# Patient Record
Sex: FEMALE | Race: BLACK OR AFRICAN AMERICAN | NOT HISPANIC OR LATINO | Employment: UNEMPLOYED | URBAN - METROPOLITAN AREA
[De-identification: names, ages, dates, MRNs, and addresses within clinical notes are randomized per-mention and may not be internally consistent; named-entity substitution may affect disease eponyms.]

---

## 2021-03-27 ENCOUNTER — HISTORICAL (OUTPATIENT)
Dept: ADMINISTRATIVE | Facility: HOSPITAL | Age: 21
End: 2021-03-27

## 2021-03-27 LAB — SARS-COV-2 RNA RESP QL NAA+PROBE: DETECTED

## 2021-11-13 ENCOUNTER — HISTORICAL (OUTPATIENT)
Dept: ADMINISTRATIVE | Facility: HOSPITAL | Age: 21
End: 2021-11-13

## 2021-11-13 LAB — POC BETA-HCG (QUAL): NEGATIVE

## 2021-11-15 LAB — FINAL CULTURE: NORMAL

## 2022-04-10 ENCOUNTER — HISTORICAL (OUTPATIENT)
Dept: ADMINISTRATIVE | Facility: HOSPITAL | Age: 22
End: 2022-04-10

## 2022-04-11 ENCOUNTER — HISTORICAL (OUTPATIENT)
Dept: ADMINISTRATIVE | Facility: HOSPITAL | Age: 22
End: 2022-04-11

## 2022-04-28 VITALS
DIASTOLIC BLOOD PRESSURE: 87 MMHG | OXYGEN SATURATION: 99 % | BODY MASS INDEX: 23.49 KG/M2 | SYSTOLIC BLOOD PRESSURE: 126 MMHG | WEIGHT: 137.56 LBS | HEIGHT: 64 IN

## 2022-04-28 VITALS
WEIGHT: 137.56 LBS | DIASTOLIC BLOOD PRESSURE: 87 MMHG | OXYGEN SATURATION: 99 % | SYSTOLIC BLOOD PRESSURE: 126 MMHG | HEIGHT: 64 IN | BODY MASS INDEX: 23.49 KG/M2

## 2023-01-29 ENCOUNTER — OFFICE VISIT (OUTPATIENT)
Dept: URGENT CARE | Facility: CLINIC | Age: 23
End: 2023-01-29
Payer: MEDICAID

## 2023-01-29 VITALS
DIASTOLIC BLOOD PRESSURE: 58 MMHG | RESPIRATION RATE: 16 BRPM | OXYGEN SATURATION: 100 % | HEART RATE: 69 BPM | SYSTOLIC BLOOD PRESSURE: 101 MMHG | BODY MASS INDEX: 18.81 KG/M2 | WEIGHT: 110.19 LBS | TEMPERATURE: 98 F | HEIGHT: 64 IN

## 2023-01-29 DIAGNOSIS — R30.0 DYSURIA: ICD-10-CM

## 2023-01-29 DIAGNOSIS — R31.9 HEMATURIA, UNSPECIFIED TYPE: ICD-10-CM

## 2023-01-29 DIAGNOSIS — N30.01 ACUTE CYSTITIS WITH HEMATURIA: Primary | ICD-10-CM

## 2023-01-29 LAB
BILIRUB UR QL STRIP: NEGATIVE
GLUCOSE UR QL STRIP: NEGATIVE
KETONES UR QL STRIP: NEGATIVE
LEUKOCYTE ESTERASE UR QL STRIP: POSITIVE
PH, POC UA: 7
POC BLOOD, URINE: POSITIVE
POC NITRATES, URINE: NEGATIVE
PROT UR QL STRIP: POSITIVE
SP GR UR STRIP: 1.02 (ref 1–1.03)
UROBILINOGEN UR STRIP-ACNC: 0.2 (ref 0.1–1.1)

## 2023-01-29 PROCEDURE — 87077 CULTURE AEROBIC IDENTIFY: CPT

## 2023-01-29 PROCEDURE — 99214 OFFICE O/P EST MOD 30 MIN: CPT | Mod: PBBFAC

## 2023-01-29 PROCEDURE — 87088 URINE BACTERIA CULTURE: CPT

## 2023-01-29 PROCEDURE — 81003 URINALYSIS AUTO W/O SCOPE: CPT | Mod: PBBFAC

## 2023-01-29 PROCEDURE — 99213 OFFICE O/P EST LOW 20 MIN: CPT | Mod: S$PBB,,,

## 2023-01-29 PROCEDURE — 99213 PR OFFICE/OUTPT VISIT, EST, LEVL III, 20-29 MIN: ICD-10-PCS | Mod: S$PBB,,,

## 2023-01-29 RX ORDER — NITROFURANTOIN 25; 75 MG/1; MG/1
100 CAPSULE ORAL 2 TIMES DAILY
Qty: 14 CAPSULE | Refills: 0 | Status: SHIPPED | OUTPATIENT
Start: 2023-01-29 | End: 2023-02-05

## 2023-01-29 NOTE — PROGRESS NOTES
"Subjective:       Patient ID: Shakir Llanos is a 22 y.o. female.    Vitals:  height is 5' 3.78" (1.62 m) and weight is 50 kg (110 lb 3.2 oz). Her temperature is 98.2 °F (36.8 °C). Her blood pressure is 101/58 (abnormal) and her pulse is 69. Her respiration is 16 and oxygen saturation is 100%.     Chief Complaint: Dysuria (X 1 week with frequency and hematuria. Denies any concern for STI.)    Pt states dysuria and hematuria for a week. Denies vaginal discharge or flank pain.     Dysuria   Associated symptoms include hematuria.     Constitution: Negative.   HENT: Negative.     Neck: neck negative.   Cardiovascular: Negative.    Eyes: Negative.    Respiratory: Negative.     Gastrointestinal: Negative.    Genitourinary:  Positive for dysuria and hematuria.   Musculoskeletal: Negative.    Skin: Negative.    Neurological: Negative.      Objective:      Physical Exam   Constitutional: She is oriented to person, place, and time. normal  HENT:   Head: Normocephalic.   Nose: Nose normal.   Mouth/Throat: Mucous membranes are moist. Oropharynx is clear.   Eyes: Pupils are equal, round, and reactive to light.   Cardiovascular: Normal rate and normal pulses.   Pulmonary/Chest: Effort normal.   Abdominal: Normal appearance. Soft. There is left CVA tenderness. There is no right CVA tenderness.   Musculoskeletal: Normal range of motion.         General: Normal range of motion.   Neurological: She is alert and oriented to person, place, and time.   Skin: Skin is warm.   Vitals reviewed.      Results for orders placed or performed in visit on 01/29/23   POCT Urinalysis, Dipstick, Automated, W/O Scope   Result Value Ref Range    POC Blood, Urine Positive (A) Negative    POC Bilirubin, Urine Negative Negative    POC Urobilinogen, Urine 0.2 0.1 - 1.1    POC Ketones, Urine Negative Negative    POC Protein, Urine Positive (A) Negative    POC Nitrates, Urine Negative Negative    POC Glucose, Urine Negative Negative    pH, UA 7.0     " POC Specific Gravity, Urine 1.020 1.003 - 1.029    POC Leukocytes, Urine Positive (A) Negative       Assessment:       1. Acute cystitis with hematuria    2. Dysuria    3. Hematuria, unspecified type            Plan:         Acute cystitis with hematuria  -     Urine culture  -     nitrofurantoin, macrocrystal-monohydrate, (MACROBID) 100 MG capsule; Take 1 capsule (100 mg total) by mouth 2 (two) times daily. for 7 days  Dispense: 14 capsule; Refill: 0    Dysuria  -     POCT Urinalysis, Dipstick, Automated, W/O Scope    Hematuria, unspecified type  -     POCT Urinalysis, Dipstick, Automated, W/O Scope    - increase water intake  - tylenol or motrin for pain and fever  - take antibiotics as prescribed - do not stop them early  - if you need an antibiotic change, we will notify you in approximately 3 days. If you do not receive a call from us, continue the medication you received today.    ER precautions given, patient verbalized understanding.     Please see provided patient education for guidance.    Follow up with PCP or return to clinic if symptoms worsen or do not improve.

## 2023-01-31 LAB — BACTERIA UR CULT: ABNORMAL

## 2023-02-23 ENCOUNTER — OFFICE VISIT (OUTPATIENT)
Dept: URGENT CARE | Facility: CLINIC | Age: 23
End: 2023-02-23
Payer: MEDICAID

## 2023-02-23 VITALS
WEIGHT: 117.63 LBS | SYSTOLIC BLOOD PRESSURE: 116 MMHG | DIASTOLIC BLOOD PRESSURE: 75 MMHG | TEMPERATURE: 98 F | HEART RATE: 76 BPM | BODY MASS INDEX: 21.65 KG/M2 | OXYGEN SATURATION: 100 % | HEIGHT: 62 IN | RESPIRATION RATE: 18 BRPM

## 2023-02-23 DIAGNOSIS — J02.9 SORE THROAT: ICD-10-CM

## 2023-02-23 DIAGNOSIS — Z32.01 POSITIVE PREGNANCY TEST: ICD-10-CM

## 2023-02-23 DIAGNOSIS — R68.89 FLU-LIKE SYMPTOMS: ICD-10-CM

## 2023-02-23 DIAGNOSIS — Z11.52 ENCOUNTER FOR SCREENING FOR SEVERE ACUTE RESPIRATORY SYNDROME CORONAVIRUS 2 (SARS-COV-2) INFECTION: Primary | ICD-10-CM

## 2023-02-23 DIAGNOSIS — Z32.00 ENCOUNTER FOR PREGNANCY TEST, RESULT UNKNOWN: ICD-10-CM

## 2023-02-23 LAB
B-HCG UR QL: POSITIVE
CTP QC/QA: YES
FLUAV AG NPH QL: NEGATIVE
FLUBV AG NPH QL: NEGATIVE
MOLECULAR STREP A: NEGATIVE
SARS-COV-2 RDRP RESP QL NAA+PROBE: NEGATIVE

## 2023-02-23 PROCEDURE — 87502 INFLUENZA DNA AMP PROBE: CPT | Mod: PBBFAC | Performed by: FAMILY MEDICINE

## 2023-02-23 PROCEDURE — 87651 STREP A DNA AMP PROBE: CPT | Mod: PBBFAC | Performed by: FAMILY MEDICINE

## 2023-02-23 PROCEDURE — 81025 URINE PREGNANCY TEST: CPT | Mod: PBBFAC | Performed by: FAMILY MEDICINE

## 2023-02-23 PROCEDURE — 99213 PR OFFICE/OUTPT VISIT, EST, LEVL III, 20-29 MIN: ICD-10-PCS | Mod: S$PBB,,, | Performed by: FAMILY MEDICINE

## 2023-02-23 PROCEDURE — 87804 INFLUENZA ASSAY W/OPTIC: CPT | Mod: 59,PBBFAC | Performed by: FAMILY MEDICINE

## 2023-02-23 PROCEDURE — 87635 SARS-COV-2 COVID-19 AMP PRB: CPT | Mod: PBBFAC | Performed by: FAMILY MEDICINE

## 2023-02-23 PROCEDURE — 99214 OFFICE O/P EST MOD 30 MIN: CPT | Mod: PBBFAC | Performed by: FAMILY MEDICINE

## 2023-02-23 PROCEDURE — 99213 OFFICE O/P EST LOW 20 MIN: CPT | Mod: S$PBB,,, | Performed by: FAMILY MEDICINE

## 2023-02-23 NOTE — PROGRESS NOTES
"Subjective:       Patient ID: Shakir Llanos is a 22 y.o. female.    Vitals:  height is 5' 2" (1.575 m) and weight is 53.3 kg (117 lb 9.6 oz). Her oral temperature is 98.4 °F (36.9 °C). Her blood pressure is 116/75 and her pulse is 76. Her respiration is 18 and oxygen saturation is 100%.     Chief Complaint: Sore Throat (x3days), Ear Fullness (C/o right ear pain. States left ear "pops sometimes". x3days), and Nasal Congestion (x3days)    Sore Throat   Associated symptoms include a plugged ear sensation.   Ear Fullness   Associated symptoms include a sore throat.     22-year-old female with recently missed menses, having URI symptoms, sore throat, no difficulty swallowing, no neck pain or stiffness, no fever.    HENT:  Positive for sore throat.      All other systems are negative  Objective:      Physical Exam   Constitutional: She appears well-developed.  Non-toxic appearance. She does not appear ill. No distress.   HENT:   Head: Atraumatic.   Nose: No purulent discharge. Right sinus exhibits no maxillary sinus tenderness and no frontal sinus tenderness. Left sinus exhibits no maxillary sinus tenderness and no frontal sinus tenderness.   Eyes: Right eye exhibits no discharge. Left eye exhibits no discharge. Extraocular movement intact   Neck: Neck supple.   Cardiovascular: Regular rhythm.   Pulmonary/Chest: Effort normal and breath sounds normal. No respiratory distress. She has no wheezes. She has no rales.   Lymphadenopathy:     She has no cervical adenopathy.   Neurological: She is alert.   Skin: Skin is warm, dry and not diaphoretic.   Psychiatric: Her behavior is normal.   Nursing note and vitals reviewed.      Results for orders placed or performed in visit on 02/23/23   POCT COVID-19 Rapid Screening   Result Value Ref Range    POC Rapid COVID Negative Negative     Acceptable Yes    POCT Influenza A/B   Result Value Ref Range    Rapid Influenza A Ag Negative Negative    Rapid Influenza B Ag " Negative Negative     Acceptable Yes    POCT Strep A, Molecular   Result Value Ref Range    Molecular Strep A, POC Negative Negative     Acceptable Yes    POCT urine pregnancy   Result Value Ref Range    POC Preg Test, Ur Positive (A) Negative     Acceptable Yes        Assessment:       1. Encounter for screening for severe acute respiratory syndrome coronavirus 2 (SARS-CoV-2) infection    2. Flu-like symptoms    3. Sore throat    4. Encounter for pregnancy test, result unknown    5. Positive pregnancy test            Plan:         Encounter for screening for severe acute respiratory syndrome coronavirus 2 (SARS-CoV-2) infection  -     POCT COVID-19 Rapid Screening    Flu-like symptoms  -     POCT Influenza A/B    Sore throat  -     POCT Strep A, Molecular    Encounter for pregnancy test, result unknown  -     POCT urine pregnancy    Positive pregnancy test  -     Ambulatory referral/consult to Obstetrics / Gynecology         Begin prenatal vitamins.  Please read patient education material concerning prenatal care.  Refer to establish OB.  Use warm salt water gargles, clear all cold medicines with OB provider prior to taking.

## 2023-04-18 ENCOUNTER — OFFICE VISIT (OUTPATIENT)
Dept: FAMILY MEDICINE | Facility: CLINIC | Age: 23
End: 2023-04-18
Payer: MEDICAID

## 2023-04-18 ENCOUNTER — HOSPITAL ENCOUNTER (OUTPATIENT)
Dept: RADIOLOGY | Facility: HOSPITAL | Age: 23
Discharge: HOME | End: 2023-04-18
Attending: OBSTETRICS & GYNECOLOGY
Payer: MEDICAID

## 2023-04-18 VITALS
HEIGHT: 62 IN | TEMPERATURE: 99 F | OXYGEN SATURATION: 99 % | RESPIRATION RATE: 18 BRPM | WEIGHT: 114.38 LBS | DIASTOLIC BLOOD PRESSURE: 55 MMHG | BODY MASS INDEX: 21.05 KG/M2 | HEART RATE: 89 BPM | SYSTOLIC BLOOD PRESSURE: 94 MMHG

## 2023-04-18 DIAGNOSIS — M79.606 PREGNANCY RELATED LEG PAIN, ANTEPARTUM: ICD-10-CM

## 2023-04-18 DIAGNOSIS — O26.899 PREGNANCY RELATED LEG PAIN, ANTEPARTUM: ICD-10-CM

## 2023-04-18 DIAGNOSIS — O99.340 DEPRESSION AFFECTING PREGNANCY, ANTEPARTUM: ICD-10-CM

## 2023-04-18 DIAGNOSIS — Z3A.12 12 WEEKS GESTATION OF PREGNANCY: Primary | ICD-10-CM

## 2023-04-18 DIAGNOSIS — F32.A DEPRESSION AFFECTING PREGNANCY, ANTEPARTUM: ICD-10-CM

## 2023-04-18 DIAGNOSIS — Z34.90 PREGNANCY: ICD-10-CM

## 2023-04-18 DIAGNOSIS — O21.9 NAUSEA AND VOMITING DURING PREGNANCY: ICD-10-CM

## 2023-04-18 LAB
ANION GAP SERPL CALC-SCNC: 7 MEQ/L
BASOPHILS # BLD AUTO: 0.02 X10(3)/MCL (ref 0–0.2)
BASOPHILS NFR BLD AUTO: 0.3 %
BILIRUB SERPL-MCNC: NORMAL MG/DL
BLOOD URINE, POC: NORMAL
BUN SERPL-MCNC: 6.2 MG/DL (ref 7–18.7)
C TRACH DNA SPEC QL NAA+PROBE: NOT DETECTED
CALCIUM SERPL-MCNC: 9.4 MG/DL (ref 8.4–10.2)
CHLORIDE SERPL-SCNC: 106 MMOL/L (ref 98–107)
CLARITY, POC UA: CLEAR
CO2 SERPL-SCNC: 24 MMOL/L (ref 22–29)
COLOR, POC UA: YELLOW
CREAT SERPL-MCNC: 0.67 MG/DL (ref 0.55–1.02)
CREAT/UREA NIT SERPL: 9
EOSINOPHIL # BLD AUTO: 0.06 X10(3)/MCL (ref 0–0.9)
EOSINOPHIL NFR BLD AUTO: 0.9 %
ERYTHROCYTE [DISTWIDTH] IN BLOOD BY AUTOMATED COUNT: 13.2 % (ref 11.5–17)
GFR SERPLBLD CREATININE-BSD FMLA CKD-EPI: >60 MLS/MIN/1.73/M2
GLUCOSE SERPL-MCNC: 67 MG/DL (ref 74–100)
GLUCOSE UR QL STRIP: NORMAL
GROUP & RH: NORMAL
HBV SURFACE AG SERPL QL IA: NONREACTIVE
HCT VFR BLD AUTO: 35.2 % (ref 37–47)
HCV AB SERPL QL IA: NONREACTIVE
HGB BLD-MCNC: 11.7 G/DL (ref 12–16)
HGB S BLD QL SOLY: NEGATIVE
HIV 1+2 AB+HIV1 P24 AG SERPL QL IA: NONREACTIVE
IMM GRANULOCYTES # BLD AUTO: 0.04 X10(3)/MCL (ref 0–0.04)
IMM GRANULOCYTES NFR BLD AUTO: 0.6 %
INDIRECT COOMBS GEL: NORMAL
KETONES UR QL STRIP: NORMAL
LEUKOCYTE ESTERASE URINE, POC: NORMAL
LYMPHOCYTES # BLD AUTO: 1.62 X10(3)/MCL (ref 0.6–4.6)
LYMPHOCYTES NFR BLD AUTO: 23.2 %
MCH RBC QN AUTO: 29.3 PG (ref 27–31)
MCHC RBC AUTO-ENTMCNC: 33.2 G/DL (ref 33–36)
MCV RBC AUTO: 88.2 FL (ref 80–94)
MONOCYTES # BLD AUTO: 0.35 X10(3)/MCL (ref 0.1–1.3)
MONOCYTES NFR BLD AUTO: 5 %
N GONORRHOEA DNA SPEC QL NAA+PROBE: NOT DETECTED
NEUTROPHILS # BLD AUTO: 4.89 X10(3)/MCL (ref 2.1–9.2)
NEUTROPHILS NFR BLD AUTO: 70 %
NITRITE, POC UA: NORMAL
NRBC BLD AUTO-RTO: 0 %
PH, POC UA: 8
PLATELET # BLD AUTO: 225 X10(3)/MCL (ref 130–400)
PMV BLD AUTO: 13.5 FL (ref 7.4–10.4)
POTASSIUM SERPL-SCNC: 3.5 MMOL/L (ref 3.5–5.1)
PROTEIN, POC: NORMAL
RBC # BLD AUTO: 3.99 X10(6)/MCL (ref 4.2–5.4)
SODIUM SERPL-SCNC: 137 MMOL/L (ref 136–145)
SPECIFIC GRAVITY, POC UA: 1.02
SPECIMEN OUTDATE: NORMAL
T PALLIDUM AB SER QL: NONREACTIVE
UROBILINOGEN, POC UA: 0.2
WBC # SPEC AUTO: 7 X10(3)/MCL (ref 4.5–11.5)

## 2023-04-18 PROCEDURE — 86900 BLOOD TYPING SEROLOGIC ABO: CPT

## 2023-04-18 PROCEDURE — 76801 OB US < 14 WKS SINGLE FETUS: CPT | Mod: TC

## 2023-04-18 PROCEDURE — 86762 RUBELLA ANTIBODY: CPT | Mod: 90

## 2023-04-18 PROCEDURE — 86803 HEPATITIS C AB TEST: CPT

## 2023-04-18 PROCEDURE — 87389 HIV-1 AG W/HIV-1&-2 AB AG IA: CPT

## 2023-04-18 PROCEDURE — 85025 COMPLETE CBC W/AUTO DIFF WBC: CPT

## 2023-04-18 PROCEDURE — 87624 HPV HI-RISK TYP POOLED RSLT: CPT

## 2023-04-18 PROCEDURE — 87625 HPV TYPES 16 & 18 ONLY: CPT | Mod: 59

## 2023-04-18 PROCEDURE — 87591 N.GONORRHOEAE DNA AMP PROB: CPT

## 2023-04-18 PROCEDURE — 80048 BASIC METABOLIC PNL TOTAL CA: CPT

## 2023-04-18 PROCEDURE — 86787 VARICELLA-ZOSTER ANTIBODY: CPT | Mod: 90

## 2023-04-18 PROCEDURE — 88174 CYTOPATH C/V AUTO IN FLUID: CPT

## 2023-04-18 PROCEDURE — 87088 URINE BACTERIA CULTURE: CPT

## 2023-04-18 PROCEDURE — 86780 TREPONEMA PALLIDUM: CPT

## 2023-04-18 PROCEDURE — 87340 HEPATITIS B SURFACE AG IA: CPT

## 2023-04-18 PROCEDURE — 99214 OFFICE O/P EST MOD 30 MIN: CPT | Mod: PBBFAC,25

## 2023-04-18 PROCEDURE — 36415 COLL VENOUS BLD VENIPUNCTURE: CPT

## 2023-04-18 PROCEDURE — 85660 RBC SICKLE CELL TEST: CPT

## 2023-04-18 PROCEDURE — 81002 URINALYSIS NONAUTO W/O SCOPE: CPT | Mod: PBBFAC

## 2023-04-18 RX ORDER — ONDANSETRON 8 MG/1
TABLET, ORALLY DISINTEGRATING ORAL
COMMUNITY
Start: 2023-04-05 | End: 2023-06-05

## 2023-04-18 RX ORDER — .BETA.-CAROTENE, ASCORBIC ACID, CHOLECALCIFEROL, .ALPHA.-TOCOPHEROL ACETATE, DL-, THIAMINE, RIBOFLAVIN, NIACINAMIDE, PYRIDOXINE HYDROCHLORIDE, FOLIC ACID, CYANOCOBALAMIN, CALCIUM PANTOTHENATE, CALCIUM CARBONATE, FERROUS FUMARATE, ZINC OXIDE AND DOCUSATE SODIUM 1000; 100; 400; 30; 3; 3; 15; 20; 1; 12; 7; 200; 29; 20; 25 [IU]/1; MG/1; [IU]/1; MG/1; MG/1; MG/1; MG/1; MG/1; MG/1; UG/1; MG/1; MG/1; MG/1; MG/1; MG/1
1 TABLET ORAL
COMMUNITY
Start: 2023-04-05 | End: 2023-10-03

## 2023-04-18 RX ORDER — CALC/MAG/B COMPLEX/D3/HERB 61
15 TABLET ORAL DAILY
Qty: 30 CAPSULE | Refills: 3 | Status: ON HOLD | OUTPATIENT
Start: 2023-04-18 | End: 2023-10-27 | Stop reason: HOSPADM

## 2023-04-18 NOTE — PROGRESS NOTES
"  [facility] OB OFFICE VISIT NOTE  Shaikr Llanos  [id number]  2023    Chief Complaint: Initial Prenatal Visit (IOB 12w6d. C/O shooting pain R leg and weight loss.)      Shakir Llanos is a 22 y.o. female   12w6d 10/25/2023, by Last Menstrual Period and consistent with US presenting to [facility] for initial OB visit.    Current Issues: back pain that started about 1 week ago. Shoots down her right leg. Has not tried anything for the pain yet. Denies numbness, weakness, tingling, loss of bladder control, trauma to area.   Also complains of nausea, vomiting up to 4 times a day since 5 weeks. Takes Zofran as needed (almost every day) prescribed to her by Dr. Medina Martel. She does not need any refills. States she had been smoking marijuana for the nausea up to 1 month ago.     Chronic Issues: Depression diagnosed , took Lexapro until . Self-discontinued because she felt emotionally "numb" after talking with therapist. She is starting back on therapy this week who would be able to prescribe her meds if she needs it (Ms Fifi Cabello). Currently does not want to restart meds if possible. No thoughts of harming herself or others. Depression screening today 16.     Gestational History:  .     Gyn History:   - LMP: 2023  - Age at menarche: 9 years  - Menstrual hx: regular, 28day cycles, 5 pads/day, 5 days per period  - History of birth control: OCP for 2 years in high school, then Nexplanon for 3 (took out 2022 as she was planning on getting pregnant.)  - History of STDs and/or Abnormal PAPs: no STDs. Last Pap in 2023, all normal (with Ms. Gonsalez in [place])  - History of prior : no    Past Medical History: depression  Surgical History: none  Family History: grandmother with heart disease, DM, HTN. Father with seizures. PGM cancer. No hx of sickle cell.   Social History: Denies cigarette use. Casual EtOH before pregnancy. Admits to marijuana (last use 1 month " "ago).  Medications: PNV, Zofran as needed    Review of Systems   Constitutional:  Negative for fatigue and fever.   Respiratory:  Negative for cough and shortness of breath.    Cardiovascular:  Negative for chest pain and leg swelling.   Gastrointestinal:  Positive for nausea and vomiting. Negative for constipation and diarrhea.   Genitourinary:  Negative for dysuria and pelvic pain.   Neurological:  Negative for seizures and headaches.     Antepartum specific   - Fetal movements: no  - Vaginal bleeding: no  - Vaginal discharge: yes- scant, egg white non-foul smelling  - Loss of fluid: no  - Contractions: no  - Headaches: no  - Vision changes: no  - Edema: no    Blood pressure (!) 94/55, pulse 89, temperature 98.5 °F (36.9 °C), temperature source Oral, resp. rate 18, height 5' 2" (1.575 m), weight 51.9 kg (114 lb 6.4 oz), last menstrual period 2023, SpO2 99 %.   Physical Exam    General: well developed, gravid female, appears happy and is cooperative  Pysch: alert and oriented X3  Resp: Lungs CTA bilaterally, no wheezing, no rhonchi, non labored respirations  CV: +2 symmetrical pulses upper extremity, mild edema of lower extremity, no murmurs, rubs or gallops  ABD: gravid, non TTP, +BS  Pelvic: Speculum exam performed, Cervix was visualized. Closed and posterior, scant clear discharge present. No active bleeding or petechia appreciated. Wet prep and GC/CT swab performed.   FHT: 161 by US  Fundal Height: n/a  MSK: Straight leg test negative. Sensation intact, str 5/5 of LE bilaterally. Ankle, patellar reflex 2+.    Current Medications:   Current Outpatient Medications   Medication Sig Dispense Refill    lansoprazole (PREVACID) 15 MG capsule Take 1 capsule (15 mg total) by mouth once daily. 30 capsule 3    ondansetron (ZOFRAN-ODT) 8 MG TbDL DISSOLVE ONE TABLET UNDER THE TONGUE TWICE DAILY      SE-CONSTANCE-19 29 mg iron- 1 mg Tab Take 1 tablet by mouth.       No current facility-administered medications for this " visit.       Labs:  Urine dipstick:   Component 09:36   Color, UA Yellow    pH, UA 8.0    WBC, UA neg    Nitrite, UA neg    Protein, POC neg    Glucose, UA neg    Ketones, UA neg    Urobilinogen, UA 0.2    Bilirubin, POC neg    Blood, UA neg    Clarity, UA Clear    Spec Grav UA 1.020        Initial OB Labs ordered 4/18/23  - Blood Type and Rh:   - Antibody Screen:   - CBC H/H:   - HIV:   - RPR:   - GC:   - CT:   - HBsAg:   - HCVAb:   - Rubella:   - Varicella:   - UA & Culture:   - Sickle Cell Screen:   - PAP:   - Influenza vaccine date:   - BTL desired: no    15-20 Weeks Lab  - Quad Screen:     28 Week Lab  - 1H GTT:   - Rhogam:   - Date of Tdap:   - CBC H/H:   - RPR:   - BTL consent:     37 Week Lab  - CBC H/H:   - RPR:   - GBS Culture:   - HIV:   - Cervical GC:     Imaging:   Initial US: done 4/18/23  Anatomy Scan:    Assessment:   1. 12 weeks gestation of pregnancy    2. Depression affecting pregnancy, antepartum    3. Nausea and vomiting during pregnancy    4. Pregnancy related leg pain, antepartum      Plan:  - OB Protocol   - PNVs  - Continue Zofran. Start Prevacid for nausea/vomiting  - Tylenol for leg pain  - Hx of depression with Depression screening score of 16: pt declines restarting antidepressants for now. She has appointment with therapist this week and wants to restart therapy before taking meds.   - Urine dip reviewed as above  - Indicated labs: PENDING  - Postpartum contraception discussion: undecided  - Labor precautions discussed in depth  - Must follow up Influenza vaccine date next visit  - Return to [facility] FM clinic in 4 weeks w/ Dr. Marisa Cunningham  [facility] HO-1

## 2023-04-18 NOTE — PATIENT INSTRUCTIONS
Well Child Exam    About this topic  A well child exam is a visit with your child's doctor to check your child's health. The doctor will check your child's growth, progress, and shot record. It is also a time for you to ask your child's doctor any questions you have about your child's health. Your child will have a full exam during the office visit. Other things that are sometimes checked are hearing, eyesight, and urine or blood tests. The doctor may give shots during your child's well visit.    General    Getting Ready for a Well Child Exam    A well child exam is a good time for you to talk with your child's doctor about any of these topics:    Eating habits or diet    How your child acts    Sleep issues    Growth    Safety    Vaccines    Toilet training    Teen years    How your child is doing in school or any learning concerns    Home life    You may want to make a written list of the things you want to talk about with your child's doctor. Be sure to bring your list of questions to your child's well visit. You may also want to do some research on your own before your office visit by reading books or looking at Web sites. Other family members, child caregivers, and grandparents may be able to help you too. Your child's doctor may ask also you about your family's health history or if your child is around anyone who smokes.    The Exam    The doctor measures your child's weight, height, and sometimes head size or body mass index (BMI). The doctor plots these numbers on a growth curve. The growth curve gives a picture of your baby's growth at each visit. The doctor may check your child's temperature, blood pressure, breathing, and heart rate. The doctor may listen to your child's heart, lungs, and belly. Your doctor will do a full exam of your child from the head to the toes.    Growth and Development Questions    Your doctor will ask you about your child's progress. The doctor will focus on the skills that are  likely to happen at your child's age. Some of these are motor skills like rolling over, walking, and running, while others are social skills, or how your child interacts with other people. Your child's doctor will also ask you how your child is doing in school.    Help for Parents    Your doctor will talk with you about any concerns you have about your child during this visit. The doctor may also talk with you about:    Getting family help or other support    Ways to help your child's brain growth    How your child plays and acts with others    Ways to help your child exercise    Safety    Eating habits    Vaccines    Quitting smoking    Help if you have a low mood after having a baby    Shots or Vaccines    It is important for your child to get shots on time. This protects from very serious illnesses like pertussis, measles, or some kinds of pneumonia. Sometimes, your child may need more than one dose of vaccine. The vaccines used today are safer than ever. Talk to your doctor if you have any questions or concerns about giving your child vaccines.    Well Child Exam Schedule    The American Academy of Pediatrics (AAP) suggests this plan for well child visits:    [place] (3 to 5 days old)    1 month old    2 months old    4 months old    6 months old    9 months old    12 months old    15 months old    18 months old    2 years old    30 months old    3 years old    4 years old    Once each year until age 21    Well child exams are very important. Since your child is healthy at this visit and it is scheduled ahead of time, you can think about things you want to ask your child's doctor. Be sure to follow the above plan for well child visits as well as any other visits your child's doctor suggests.    Where can I learn more?    Centers for Disease Control and Prevention    http://www.cdc.gov/vaccines     Healthy  Children    https://www.healthychildren.org/English/family-life/health-management/Pages/Well-Child-Care-A-Check-Up-for-Success.aspx    Disclaimer.  This generalized information is a limited summary of diagnosis, treatment, and/or medication information. It is not meant to be comprehensive and should be used as a tool to help the user understand and/or assess potential diagnostic and treatment options. It does NOT include all information about conditions, treatments, medications, side effects, or risks that may apply to a specific patient. It is not intended to be medical advice or a substitute for the medical advice, diagnosis, or treatment of a health care provider based on the health care provider's examination and assessment of a patients specific and unique circumstances. Patients must speak with a health care provider for complete information about their health, medical questions, and treatment options, including any risks or benefits regarding use of medications. This information does not endorse any treatments or medications as safe, effective, or approved for treating a specific patient. [employer] and its affiliates disclaim any warranty or liability relating to this information or the use thereof. The use of this information is governed by the Terms of Use, available at Terms of Use. ©2022 [employer], [employer] and its affiliates and/or licensors. All rights reserved.

## 2023-04-19 LAB
RUBV IGG SERPL IA-ACNC: 1.9
RUBV IGG SERPL QL IA: POSITIVE
VZV IGG SER IA-ACNC: 2.4
VZV IGG SER QL IA: POSITIVE

## 2023-04-19 NOTE — PROGRESS NOTES
Continue to monitor musculoskeletal pain will refer to physical therapy if not resolved or worsens    I have personally reviewed the review of systems (ROS) and past, family and social histories (PFSH) documented above by the resident.  I have reviewed the care furnished by the resident during the encounter, including a review of the patient's medical history, the resident's findings on physical examination, diagnosis, and the treatment plan.  I participated in the management of the patient and was immediately available throughout the encounter.   I was physically present during all key portions of the service(s) provided with the resident.  Services were furnished in a primary care center located in the outpatient department of a [facility].

## 2023-04-20 LAB — BACTERIA UR CULT: NO GROWTH

## 2023-04-21 ENCOUNTER — HOSPITAL ENCOUNTER (EMERGENCY)
Facility: HOSPITAL | Age: 23
Discharge: HOME | End: 2023-04-21
Attending: INTERNAL MEDICINE
Payer: MEDICAID

## 2023-04-21 VITALS
BODY MASS INDEX: 19.92 KG/M2 | RESPIRATION RATE: 16 BRPM | DIASTOLIC BLOOD PRESSURE: 65 MMHG | HEIGHT: 63 IN | HEART RATE: 85 BPM | TEMPERATURE: 98 F | OXYGEN SATURATION: 100 % | WEIGHT: 112.44 LBS | SYSTOLIC BLOOD PRESSURE: 118 MMHG

## 2023-04-21 DIAGNOSIS — O20.0 THREATENED MISCARRIAGE: ICD-10-CM

## 2023-04-21 DIAGNOSIS — O46.90 VAGINAL BLEEDING IN PREGNANCY: Primary | ICD-10-CM

## 2023-04-21 LAB
ALBUMIN SERPL-MCNC: 3.6 G/DL (ref 3.5–5)
ALBUMIN/GLOB SERPL: 1 RATIO (ref 1.1–2)
ALP SERPL-CCNC: 46 UNIT/L (ref 40–150)
ALT SERPL-CCNC: 9 UNIT/L (ref 0–55)
APPEARANCE UR: ABNORMAL
AST SERPL-CCNC: 20 UNIT/L (ref 5–34)
BACTERIA #/AREA URNS AUTO: ABNORMAL /HPF
BASOPHILS # BLD AUTO: 0.04 X10(3)/MCL (ref 0–0.2)
BASOPHILS NFR BLD AUTO: 0.8 %
BILIRUB UR QL STRIP.AUTO: NEGATIVE MG/DL
BILIRUBIN DIRECT+TOT PNL SERPL-MCNC: 0.3 MG/DL
BUN SERPL-MCNC: 5.6 MG/DL (ref 7–18.7)
CALCIUM SERPL-MCNC: 9.2 MG/DL (ref 8.4–10.2)
CHLORIDE SERPL-SCNC: 106 MMOL/L (ref 98–107)
CO2 SERPL-SCNC: 23 MMOL/L (ref 22–29)
COLOR UR AUTO: YELLOW
CREAT SERPL-MCNC: 0.69 MG/DL (ref 0.55–1.02)
EOSINOPHIL # BLD AUTO: 0.08 X10(3)/MCL (ref 0–0.9)
EOSINOPHIL NFR BLD AUTO: 1.5 %
ERYTHROCYTE [DISTWIDTH] IN BLOOD BY AUTOMATED COUNT: 13.1 % (ref 11.5–17)
GFR SERPLBLD CREATININE-BSD FMLA CKD-EPI: >60 MLS/MIN/1.73/M2
GLOBULIN SER-MCNC: 3.5 GM/DL (ref 2.4–3.5)
GLUCOSE SERPL-MCNC: 89 MG/DL (ref 74–100)
GLUCOSE UR QL STRIP.AUTO: NORMAL MG/DL
HCT VFR BLD AUTO: 37.9 % (ref 37–47)
HGB BLD-MCNC: 12.3 G/DL (ref 12–16)
IMM GRANULOCYTES # BLD AUTO: 0.02 X10(3)/MCL (ref 0–0.04)
IMM GRANULOCYTES NFR BLD AUTO: 0.4 %
KETONES UR QL STRIP.AUTO: NEGATIVE MG/DL
LEUKOCYTE ESTERASE UR QL STRIP.AUTO: 75 UNIT/L
LYMPHOCYTES # BLD AUTO: 1.51 X10(3)/MCL (ref 0.6–4.6)
LYMPHOCYTES NFR BLD AUTO: 28.4 %
MCH RBC QN AUTO: 28.6 PG (ref 27–31)
MCHC RBC AUTO-ENTMCNC: 32.5 G/DL (ref 33–36)
MCV RBC AUTO: 88.1 FL (ref 80–94)
MONOCYTES # BLD AUTO: 0.32 X10(3)/MCL (ref 0.1–1.3)
MONOCYTES NFR BLD AUTO: 6 %
NEUTROPHILS # BLD AUTO: 3.34 X10(3)/MCL (ref 2.1–9.2)
NEUTROPHILS NFR BLD AUTO: 62.9 %
NITRITE UR QL STRIP.AUTO: NEGATIVE
NRBC BLD AUTO-RTO: 0 %
PH UR STRIP.AUTO: 6.5 [PH]
PLATELET # BLD AUTO: 240 X10(3)/MCL (ref 130–400)
PMV BLD AUTO: 12.8 FL (ref 7.4–10.4)
POTASSIUM SERPL-SCNC: 3.6 MMOL/L (ref 3.5–5.1)
PROT SERPL-MCNC: 7.1 GM/DL (ref 6.4–8.3)
PROT UR QL STRIP.AUTO: ABNORMAL MG/DL
PSYCHE PATHOLOGY RESULT: NORMAL
RBC # BLD AUTO: 4.3 X10(6)/MCL (ref 4.2–5.4)
RBC #/AREA URNS AUTO: ABNORMAL /HPF
RBC UR QL AUTO: ABNORMAL UNIT/L
SODIUM SERPL-SCNC: 136 MMOL/L (ref 136–145)
SP GR UR STRIP.AUTO: 1.02
SQUAMOUS #/AREA URNS LPF: ABNORMAL /HPF
UROBILINOGEN UR STRIP-ACNC: NORMAL MG/DL
WBC # SPEC AUTO: 5.3 X10(3)/MCL (ref 4.5–11.5)
WBC #/AREA URNS AUTO: ABNORMAL /HPF

## 2023-04-21 PROCEDURE — 85025 COMPLETE CBC W/AUTO DIFF WBC: CPT | Performed by: PHYSICIAN ASSISTANT

## 2023-04-21 PROCEDURE — 99284 EMERGENCY DEPT VISIT MOD MDM: CPT | Mod: 25

## 2023-04-21 PROCEDURE — 81001 URINALYSIS AUTO W/SCOPE: CPT | Performed by: PHYSICIAN ASSISTANT

## 2023-04-21 PROCEDURE — 80053 COMPREHEN METABOLIC PANEL: CPT | Performed by: PHYSICIAN ASSISTANT

## 2023-04-21 NOTE — FIRST PROVIDER EVALUATION
"Medical screening examination initiated.  I have conducted a focused provider triage encounter, findings are as follows:    Brief history of present illness:  Patient is approx 13 weeks pregnant. Began spotting last PM & passes clot just PTA.    Vitals:    04/21/23 1402   BP: (!) 126/56   BP Location: Left arm   Patient Position: Sitting   Pulse: 88   Resp: 18   Temp: 97.9 °F (36.6 °C)   TempSrc: Tympanic   SpO2: 100%   Weight: 51 kg (112 lb 7 oz)   Height: 5' 3" (1.6 m)       Pertinent physical exam:  VSS, NAD    Brief workup plan:  labs ordered    Preliminary workup initiated; this workup will be continued and followed by the physician or advanced practice provider that is assigned to the patient when roomed.  "

## 2023-04-21 NOTE — ED PROVIDER NOTES
Encounter Date: 2023       History     Chief Complaint   Patient presents with    Vaginal Bleeding    vaginal bleeding in pregnancy     Pt reports 13 wks OB W VAG BLEEDING TODAY. NO ABD CRAMPING REPORTED.  .      21 YO AAF in ER with complaints of vaginal spotting yesterday that has slightly worsened today with a clot about the size of a quarter earlier today.  about 13 weeks pregnant. Her OB is at [facility]. She did have intercourse 2 day ago. Denies cramping, loss of fluids, contractions, abnormal discharge. Not feeling any fetal movements yet. Denies fever, chills, chest pain, SOB, abdominal pain, N/V/D, HA or dizziness. No other complaints.     The history is provided by the patient.   Review of patient's allergies indicates:  No Known Allergies  Past Medical History:   Diagnosis Date    Asthma     Mental disorder     depression and anxiety    Trauma      History reviewed. No pertinent surgical history.  Family History   Problem Relation Age of Onset    Seizures Father     Hyperlipidemia Mother      Social History     Tobacco Use    Smoking status: Former     Types: Vaping with nicotine     Quit date:      Years since quittin.3     Passive exposure: Current    Smokeless tobacco: Never   Substance Use Topics    Alcohol use: Not Currently     Comment: social    Drug use: Not Currently     Types: Marijuana     Comment: past     Review of Systems   Constitutional:  Negative for chills and fever.   HENT:  Negative for congestion and sore throat.    Respiratory:  Negative for shortness of breath.    Cardiovascular:  Negative for chest pain.   Gastrointestinal:  Negative for abdominal pain, diarrhea, nausea and vomiting.   Genitourinary:  Positive for vaginal bleeding. Negative for dysuria, pelvic pain, vaginal discharge and vaginal pain.   Musculoskeletal:  Negative for back pain.   Skin:  Negative for rash.   Neurological:  Negative for dizziness, weakness, light-headedness and  headaches.   Hematological:  Does not bruise/bleed easily.   All other systems reviewed and are negative.    Physical Exam     Initial Vitals [04/21/23 1402]   BP Pulse Resp Temp SpO2   (!) 126/56 88 18 97.9 °F (36.6 °C) 100 %      MAP       --         Physical Exam    Nursing note and vitals reviewed.  Constitutional: She appears well-developed and well-nourished. She is not diaphoretic. No distress.   HENT:   Head: Normocephalic and atraumatic.   Nose: Nose normal.   Eyes: Conjunctivae are normal.   Cardiovascular:  Normal rate, regular rhythm and normal heart sounds.           Pulmonary/Chest: Breath sounds normal.   Abdominal: Abdomen is soft. Bowel sounds are normal. There is no abdominal tenderness. There is no rebound and no guarding.   Genitourinary:    Pelvic exam was performed with patient supine.   Uterus is enlarged (gravid). Cervix exhibits no motion tenderness.    Vaginal bleeding present.   There is bleeding in the vagina.    No foreign body in the vagina.      Genitourinary Comments: Cervix is closed on bimanual exam, mild blood in vault and at cervical os     Musculoskeletal:         General: Normal range of motion.     Neurological: She is alert and oriented to person, place, and time.   Skin: Skin is warm and dry.   Psychiatric: She has a normal mood and affect.       ED Course   Procedures  Labs Reviewed   COMPREHENSIVE METABOLIC PANEL - Abnormal; Notable for the following components:       Result Value    Blood Urea Nitrogen 5.6 (*)     Albumin/Globulin Ratio 1.0 (*)     All other components within normal limits   URINALYSIS, REFLEX TO URINE CULTURE - Abnormal; Notable for the following components:    Appearance, UA Turbid (*)     Protein, UA 1+ (*)     Blood, UA 3+ (*)     Leukocyte Esterase, UA 75 (*)     Bacteria, UA Trace (*)     All other components within normal limits   CBC WITH DIFFERENTIAL - Abnormal; Notable for the following components:    MCHC 32.5 (*)     MPV 12.8 (*)     All other  components within normal limits   CBC W/ AUTO DIFFERENTIAL    Narrative:     The following orders were created for panel order CBC auto differential.  Procedure                               Abnormality         Status                     ---------                               -----------         ------                     CBC with Differential[[id number]]        Abnormal            Final result                 Please view results for these tests on the individual orders.   EXTRA TUBES    Narrative:     The following orders were created for panel order EXTRA TUBES.  Procedure                               Abnormality         Status                     ---------                               -----------         ------                     Light Blue Top Hold[[id number]]                              In process                 Gold Top Hold[[id number]]                                    In process                 Pink Top Hold[[id number]]                                    In process                   Please view results for these tests on the individual orders.   LIGHT BLUE TOP HOLD   GOLD TOP HOLD   PINK TOP HOLD          Imaging Results              US OB <14 Wks TransAbd & TransVag, Single Gestation (XPD) (Final result)  Result time 04/21/23 16:19:31      Final result by Oziel Madison MD (04/21/23 16:19:31)                   Impression:      Single live intrauterine fetus with average ultrasound age 13 weeks 4 days.      Electronically signed by: Oziel Madison  Date:    04/21/2023  Time:    16:19               Narrative:    EXAMINATION:  US OB <14 WEEKS, TRANSABDOM & TRANSVAG, SINGLE GESTATION (XPD)    CLINICAL HISTORY:  vaginal bleeding;    TECHNIQUE:  Transabdominal ultrasound of the pelvis.    COMPARISON:  Ultrasound 04/18/2023    FINDINGS:  Single intrauterine fetus identified.  Average crown-rump length of 7.47 cm.  Average ultrasound age is 13 weeks 4 days +/-1 week 2 days with BENITO of 10/23/2023.  Fetal heart  rate 153 beats per minute.  Posterior placenta which is low lying at this point, but can be followed on 2nd trimester ultrasounds.  Fetal movement documented on the cine clips.  Cervical length approximately 6 cm.                                       Medications - No data to display              ED Course as of 04/21/23 1642 Fri Apr 21, 2023   1639 VSS, NAD, pt is non-toxic or ill appearing, labs and imaging reviewed with pt, pt to be on pelvic rest until seen by her OB, she should call the office on Monday for a follow up appointment, she verbalized understanding, strict return to ER precautions given, treatment plan and discharge instructions including follow up discussed, pt verbalized understanding, all questions answered, pt is stable and ready for discharge  [TT]      ED Course User Index  [TT] RAEANN Donahue                 Clinical Impression:   Final diagnoses:  [O46.90] Vaginal bleeding in pregnancy (Primary)  [O20.0] Threatened miscarriage        ED Disposition Condition    Discharge Stable          ED Prescriptions    None       Follow-up Information       Follow up With Specialties Details Why Contact Info Additional Information    [facility] Emergency Medicine In 3 days As needed, If symptoms worsen [facility] [address] [phone]     [facility] Family Medicine  Call on Monday for a follow up appointment [facility] [address] [phone] Family Medicine Clinic [address]             RAEANN Donahue  04/21/23 1642

## 2023-04-21 NOTE — DISCHARGE INSTRUCTIONS
Take all medications as prescribed.     Pelvic rest (nothing in the vagina) until cleared by your OB.     Call your OB on Monday to schedule a follow up appointment.    Drink plenty of fluids and get a lot of rest.     Return to ER for any changes or worsening of symptoms.

## 2023-04-25 ENCOUNTER — PATIENT OUTREACH (OUTPATIENT)
Dept: FAMILY MEDICINE | Facility: CLINIC | Age: 23
End: 2023-04-25
Payer: MEDICAID

## 2023-04-27 NOTE — PROGRESS NOTES
.Follow-up:    Appointment reminder given for     5/5/23    Patient verbalized understanding.        Other comments:    Pt went to ED 4/21/23 for vaginal bleeding. No cramping, US done. Pt denies bleeding since that day. Pt has f/u in [facility] clinic on 5/8/23. Pt denies issues with food/utility/transp/housing. Discussed ED precautions, where to get care and importance of attending all appts.                Education given on

## 2023-05-05 ENCOUNTER — PATIENT OUTREACH (OUTPATIENT)
Dept: FAMILY MEDICINE | Facility: CLINIC | Age: 23
End: 2023-05-05
Payer: MEDICAID

## 2023-05-05 DIAGNOSIS — Z3A.15 15 WEEKS GESTATION OF PREGNANCY: Primary | ICD-10-CM

## 2023-05-05 NOTE — PROGRESS NOTES
Reminded pt of appt on Monday, 5/8/23 at [facility] OB clinic. She states she will attend.

## 2023-05-08 ENCOUNTER — OFFICE VISIT (OUTPATIENT)
Dept: FAMILY MEDICINE | Facility: CLINIC | Age: 23
End: 2023-05-08
Payer: MEDICAID

## 2023-05-08 VITALS
DIASTOLIC BLOOD PRESSURE: 69 MMHG | HEIGHT: 63 IN | WEIGHT: 118 LBS | HEART RATE: 75 BPM | BODY MASS INDEX: 20.91 KG/M2 | RESPIRATION RATE: 20 BRPM | TEMPERATURE: 98 F | OXYGEN SATURATION: 99 % | SYSTOLIC BLOOD PRESSURE: 105 MMHG

## 2023-05-08 DIAGNOSIS — F32.A DEPRESSION AFFECTING PREGNANCY: ICD-10-CM

## 2023-05-08 DIAGNOSIS — O99.340 DEPRESSION AFFECTING PREGNANCY: ICD-10-CM

## 2023-05-08 DIAGNOSIS — Z3A.15 15 WEEKS GESTATION OF PREGNANCY: Primary | ICD-10-CM

## 2023-05-08 DIAGNOSIS — R87.810 ASCUS WITH POSITIVE HIGH RISK HPV CERVICAL: ICD-10-CM

## 2023-05-08 DIAGNOSIS — R87.610 ASCUS WITH POSITIVE HIGH RISK HPV CERVICAL: ICD-10-CM

## 2023-05-08 LAB
BILIRUB SERPL-MCNC: NEGATIVE MG/DL
BLOOD URINE, POC: NEGATIVE
CLARITY, POC UA: CLEAR
COLOR, POC UA: YELLOW
GLUCOSE UR QL STRIP: NEGATIVE
KETONES UR QL STRIP: NEGATIVE
LEUKOCYTE ESTERASE URINE, POC: NORMAL
NITRITE, POC UA: NEGATIVE
PH, POC UA: 6.5
PROTEIN, POC: NEGATIVE
SPECIFIC GRAVITY, POC UA: 1.02
UROBILINOGEN, POC UA: NORMAL

## 2023-05-08 PROCEDURE — 81511 FTL CGEN ABNOR FOUR ANAL: CPT | Mod: 90

## 2023-05-08 PROCEDURE — 99214 OFFICE O/P EST MOD 30 MIN: CPT | Mod: PBBFAC

## 2023-05-08 PROCEDURE — 81002 URINALYSIS NONAUTO W/O SCOPE: CPT | Mod: PBBFAC

## 2023-05-08 PROCEDURE — 36415 COLL VENOUS BLD VENIPUNCTURE: CPT

## 2023-05-08 NOTE — PROGRESS NOTES
"  [facility] OB OFFICE VISIT NOTE  Shakir Llanos  [id number]  2023    Chief Complaint: Routine prenatal visit    Shakir Llanos is a 22 y.o. female   15w5d 10/25/2023, by Last Menstrual Period and consistent with US presenting to [facility] for routine OB visit.    Current Issues: No complaints today. Currently on Amoxicillin qid x7d for gum infection, prescribed by dentist. No fevers, chills.     Chronic Issues: Depression diagnosed , took Lexapro until . Self-discontinued because she felt emotionally "numb" after talking with therapist. Seeing therapist (Ms Fifi Cabello) every 2 weeks now, no meds yet. No thoughts of harming herself or others.     Gestational History:  .     Gyn History:   - LMP: 2023  - Age at menarche: 9 years  - Menstrual hx: regular, 28day cycles, 5 pads/day, 5 days per period  - History of birth control: OCP for 2 years in high school, then Nexplanon for 3 (took out 2022 as she was planning on getting pregnant.)  - History of STDs and/or Abnormal PAPs: no STDs. Last Pap in 2023, all normal (with Ms. Gonsalez in [place])  - History of prior : no    Past Medical History: depression  Surgical History: none  Family History: grandmother with heart disease, DM, HTN. Father with seizures. PGM cancer. No hx of sickle cell.   Social History: Denies cigarette use. Casual EtOH before pregnancy. Admits to marijuana (last use 1.5 month ago).  Medications: PNV, Zofran as needed    ROS: see HPI.    Antepartum specific   - Fetal movements: no  - Vaginal bleeding: no  - Vaginal discharge: yes- scant, egg white non-foul smelling  - Loss of fluid: no  - Contractions: no  - Headaches: yes, last week  - Vision changes: no  - Edema: no    Blood pressure 105/69, pulse 75, temperature 97.6 °F (36.4 °C), temperature source Oral, resp. rate 20, height 5' 3" (1.6 m), weight 53.5 kg (118 lb), last menstrual period 2023, SpO2 99 %.   Physical " Exam    General: well developed, gravid female, appears happy and is cooperative  Pysch: alert and oriented X3  Resp: Lungs CTA bilaterally, no wheezing, no rhonchi, non labored respirations  CV: +2 symmetrical pulses upper extremity, mild edema of lower extremity, no murmurs, rubs or gallops  ABD: gravid, non TTP, +BS  FHT: 151 by Doppler  Fundal Height: n/a    Current Medications:   Current Outpatient Medications   Medication Sig Dispense Refill    lansoprazole (PREVACID) 15 MG capsule Take 1 capsule (15 mg total) by mouth once daily. 30 capsule 3    ondansetron (ZOFRAN-ODT) 8 MG TbDL DISSOLVE ONE TABLET UNDER THE TONGUE TWICE DAILY      SE--19 29 mg iron- 1 mg Tab Take 1 tablet by mouth.       No current facility-administered medications for this visit.       Labs:  Lab Results   Component Value Date    COLORU Yellow 2023    SPECGRAV 1.020 2023    PHUR 6.5 2023    WBCUR Trace 2023    NITRITE Negative 2023    PROTEINPOC Negative 2023    GLUCOSEUR Negative 2023    KETONESU Negative 2023    UROBILINOGEN 1.0 E.U. /dL 2023    BILIRUBINPOC Negative 2023    RBCUR Negative 2023         Initial OB Labs ordered 23  - Blood Type and Rh: O pos  - Antibody Screen: neg  - CBC H/H: 11.7/35.2  - HIV: neg  - RPR: neg  - GC: neg  - CT: neg  - HBsAg: neg  - HCVAb: neg  - Rubella: immune  - Varicella: immune  - UA & Culture: NG  - Sickle Cell Screen: neg  - PAP: ASCUS, HRHPV positive. HPV 16, 18/45 negative.   - Influenza vaccine date: states did not get shot last season  - BTL desired: no    15-20 Weeks Lab ordered today (23)  - Quad Screen:     28 Week Lab  - 1H GTT:   - Rhogam:   - Date of Tdap:   - CBC H/H:   - RPR:   - BTL consent:     37 Week Lab  - CBC H/H:   - RPR:   - GBS Culture:   - HIV:   - Cervical GC:     Imaging:   Initial US: 23: Single intrauterine pregnancy with a viable fetus with a fetal heart rate of 161 beats per  minute.Estimated date of delivery October 20, 2023  Estimated age by ultrasound 13 weeks and 4 days +/-1 week 2 days.    Anatomy Scan: sched 6/7/23    Assessment:   1. 15 weeks gestation of pregnancy    2. ASCUS with positive high risk HPV cervical      Plan:  - Gyn referral for ASCUS with HRHPV+ (neg HPV 16, 18/45)  - OB Protocol   - PNVs  - Continue Zofran prn, Prevacid qd for nausea/vomiting  - Tylenol for headaches  - Hx of depression with Depression screening score of 8: seeing therapist   - Urine dip reviewed as above  - Quad screen ordered  - Postpartum contraception discussion: undecided  - Labor precautions discussed in depth    - Return to [facility] FM clinic in 4 weeks     Columba Cunningham  [facility] HO-1

## 2023-05-10 LAB
# FETUSES: NORMAL
2ND TRIMESTER 4 SCREEN SERPL-IMP: NORMAL
AFP ADJ MOM SERPL: 0.79 MOM
AFP SERPL IA-MCNC: 33 NG/ML
AGE AT DELIVERY: NORMAL
B-HCG ADJ MOM SERPL: 0.6 MOM
CHORION TYPE: NORMAL
COLLECT DATE: NORMAL
CURRENT SMOKER: NORMAL
FET TS 21 RISK FROM MAT AGE: NORMAL
GA METHOD: NORMAL
GA US.COMPOSITE.EST: NORMAL WK,D
HCG SERPL IA-ACNC: 30.4 IU/ML
HX OF NTD QL: NO
HX OF NTD QL: NO
HX OF TRISOMY 21 QL: NO
IDDM PATIENT QL: NO
INHIBIN A ADJ MOM SERPL: 0.84 MOM
INHIBIN SERPL-MCNC: 127 PG/ML
IVF PREGNANCY: NO
LABORATORY COMMENT REPORT: NORMAL
M PHYSICIAN PHONE NUMBER: NORMAL
MATERNAL RISK FACTORS: NORMAL
NEURAL TUBE DEFECT RISK FETUS: NORMAL %
RECOM F/U: NORMAL
TEST PERFORMANCE INFO SPEC: NORMAL
TS 18 RISK FETUS: NORMAL
TS 21 RISK FETUS: NORMAL
U ESTRIOL ADJ MOM SERPL: 1.35 MOM
U ESTRIOL SERPL-MCNC: 1.1 NG/ML

## 2023-06-02 ENCOUNTER — OFFICE VISIT (OUTPATIENT)
Dept: FAMILY MEDICINE | Facility: CLINIC | Age: 23
End: 2023-06-02
Payer: MEDICAID

## 2023-06-02 VITALS
HEART RATE: 78 BPM | SYSTOLIC BLOOD PRESSURE: 106 MMHG | HEIGHT: 63 IN | BODY MASS INDEX: 21.2 KG/M2 | OXYGEN SATURATION: 100 % | DIASTOLIC BLOOD PRESSURE: 70 MMHG | WEIGHT: 119.63 LBS | TEMPERATURE: 98 F

## 2023-06-02 DIAGNOSIS — R21 RASH: Primary | ICD-10-CM

## 2023-06-02 PROCEDURE — 99214 OFFICE O/P EST MOD 30 MIN: CPT | Mod: PBBFAC

## 2023-06-02 RX ORDER — TRIAMCINOLONE ACETONIDE 1 MG/G
OINTMENT TOPICAL 2 TIMES DAILY PRN
Qty: 453.6 G | Refills: 0 | Status: SHIPPED | OUTPATIENT
Start: 2023-06-02 | End: 2023-07-05

## 2023-06-02 RX ORDER — VITAMIN A, VITAMIN C, VITAMIN D, VITAMIN E, THIAMINE, RIBOFLAVIN, NIACIN, VITAMIN B6, FOLIC ACID, VITAMIN B12, CALCIUM, IRON, ZINC, COPPER 4000; 120; 400; 22; 1.84; 3; 20; 10; 1; 12; 200; 27; 25; 2 [IU]/1; MG/1; [IU]/1; [IU]/1; MG/1; MG/1; MG/1; MG/1; MG/1; UG/1; MG/1; MG/1; MG/1; MG/1
1 TABLET ORAL
COMMUNITY
Start: 2023-05-12 | End: 2023-10-03 | Stop reason: SDUPTHER

## 2023-06-02 NOTE — PROGRESS NOTES
"[facility] OFFICE VISIT NOTE  MRN: [id number]  Date: 2023    Chief Complaint: Rash (On left arm and in between both legs.)      Subjective:  This is a 22-year-old female with past medical history of eczema as a child who presents to [facility] walk-in clinic with complaint of a rash that started approximately 3 weeks ago.    Rash  This is a new problem. The current episode started 1 to 4 weeks ago. The problem has been waxing and waning since onset. The affected locations include the left elbow, left upper leg, left leg, right upper leg and right leg. The rash is characterized by pain, redness and itchiness. She was exposed to nothing. Pertinent negatives include no anorexia, congestion, cough, diarrhea, eye pain, facial edema, fatigue, fever, joint pain, nail changes, rhinorrhea, shortness of breath, sore throat or vomiting. Past treatments include anti-itch cream. The treatment provided no relief. Her past medical history is significant for eczema. There is no history of allergies, asthma or varicella.       Review of Systems   Constitutional:  Negative for fatigue and fever.   HENT:  Negative for congestion, rhinorrhea and sore throat.    Eyes:  Negative for pain.   Respiratory:  Negative for cough and shortness of breath.    Gastrointestinal:  Negative for anorexia, diarrhea and vomiting.   Musculoskeletal:  Negative for joint pain.   Skin:  Positive for rash. Negative for nail changes.     Current Medications:   Current Outpatient Medications   Medication Sig Dispense Refill    lansoprazole (PREVACID) 15 MG capsule Take 1 capsule (15 mg total) by mouth once daily. 30 capsule 3    ondansetron (ZOFRAN-ODT) 8 MG TbDL DISSOLVE ONE TABLET UNDER THE TONGUE TWICE DAILY      SE-CONSTANCE-19 29 mg iron- 1 mg Tab Take 1 tablet by mouth.       No current facility-administered medications for this visit.       Objective:  Blood pressure 106/70, pulse 78, temperature 98.1 °F (36.7 °C), temperature source Oral, height 5' 3" " (1.6 m), weight 54.3 kg (119 lb 9.6 oz), last menstrual period 01/18/2023, SpO2 100 %.   Physical Exam  General: in no acute distress  Respiratory:  No increased work of breathing  Cardiovascular: regular rate and rhythm. S1/S2 present. No murmurs, gallops or rubs appreciated  Gastrointestinal: soft, non-tender, non-distended with normal bowel sounds  Integumentary:  Patient has erythematous raised rash to flexor surfaces of the right upper extremities and the bilateral popliteal fossae.  See images below.  Neurologic: Alert and oriented x3                Assessment:   1. Rash        Plan:  -eczematous in appearance   -Trial of triamcinolone ointment  -Use lukewarm water, continue to use emollient. Avoid fragrance detergents/soaps.    Return to clinic in 1 week.    Elle Tobin MD  [employer]  Resident, -3

## 2023-06-05 ENCOUNTER — OFFICE VISIT (OUTPATIENT)
Dept: FAMILY MEDICINE | Facility: CLINIC | Age: 23
End: 2023-06-05
Payer: MEDICAID

## 2023-06-05 VITALS
HEART RATE: 110 BPM | DIASTOLIC BLOOD PRESSURE: 74 MMHG | SYSTOLIC BLOOD PRESSURE: 113 MMHG | RESPIRATION RATE: 20 BRPM | WEIGHT: 120 LBS | TEMPERATURE: 98 F | BODY MASS INDEX: 21.26 KG/M2 | OXYGEN SATURATION: 98 % | HEIGHT: 63 IN

## 2023-06-05 DIAGNOSIS — R87.610 ASCUS WITH POSITIVE HIGH RISK HPV CERVICAL: ICD-10-CM

## 2023-06-05 DIAGNOSIS — O99.340 DEPRESSION AFFECTING PREGNANCY: ICD-10-CM

## 2023-06-05 DIAGNOSIS — R87.810 ASCUS WITH POSITIVE HIGH RISK HPV CERVICAL: ICD-10-CM

## 2023-06-05 DIAGNOSIS — Z3A.19 19 WEEKS GESTATION OF PREGNANCY: Primary | ICD-10-CM

## 2023-06-05 DIAGNOSIS — F32.A DEPRESSION AFFECTING PREGNANCY: ICD-10-CM

## 2023-06-05 LAB
BILIRUB SERPL-MCNC: NORMAL MG/DL
BLOOD URINE, POC: NORMAL
CLARITY, POC UA: CLEAR
COLOR, POC UA: YELLOW
GLUCOSE UR QL STRIP: NORMAL
KETONES UR QL STRIP: NORMAL
LEUKOCYTE ESTERASE URINE, POC: NORMAL
NITRITE, POC UA: NORMAL
PH, POC UA: 7
PROTEIN, POC: NORMAL
SPECIFIC GRAVITY, POC UA: 1.02
UROBILINOGEN, POC UA: 1

## 2023-06-05 PROCEDURE — 99214 OFFICE O/P EST MOD 30 MIN: CPT | Mod: PBBFAC

## 2023-06-05 PROCEDURE — 81002 URINALYSIS NONAUTO W/O SCOPE: CPT | Mod: PBBFAC

## 2023-06-05 NOTE — PROGRESS NOTES
I have discussed the case with the resident and reviewed the resident's history and physical, assessment, plan, and progress note. I agree with the findings.       Jaspreet Obrien MD  [facility]

## 2023-06-05 NOTE — PROGRESS NOTES
"  [facility] OB OFFICE VISIT NOTE  Shakir Llanos  [id number]  2023    Chief Complaint: Routine prenatal visit    Shakir Llanos is a 22 y.o. female   19w5d 10/25/2023, by Last Menstrual Period and consistent with US presenting to [facility] for routine OB visit.    Current Issues: None    Chronic Issues:   Depression diagnosed , took Lexapro until . Self-discontinued Seeing therapist (Ms Fifi Cabello) every 2 weeks now  No SI/HI  Has good social support     Gestational History:  .     Medications: PNV    ROS: see HPI.    Antepartum specific   - Fetal movements: no  - Vaginal bleeding: no  - Vaginal discharge: yes- scant,  white non-foul smelling  - Loss of fluid: no  - Contractions: no  - Headaches: yes, apartment has mold  - Vision changes: no  - Edema: no    Blood pressure 113/74, pulse 110, temperature 98.2 °F (36.8 °C), temperature source Oral, resp. rate 20, height 5' 3" (1.6 m), weight 54.4 kg (120 lb), last menstrual period 2023, SpO2 98 %.   Physical Exam    General: NAD, gravid  Resp: CTABL, nonlabred  CV: RRR, no MRG, 2+ radial, no peripheral edema  ABD: gravid, nontender, gross fetal movements  FHT: 140s-150s by Doppler  Fundal Height: 19cm    Current Medications:   Current Outpatient Medications   Medication Sig Dispense Refill    lansoprazole (PREVACID) 15 MG capsule Take 1 capsule (15 mg total) by mouth once daily. 30 capsule 3    M-CONSTANCE PLUS 27 mg iron- 1 mg Tab Take 1 tablet by mouth.      SE-CONSTANCE-19 29 mg iron- 1 mg Tab Take 1 tablet by mouth.      triamcinolone acetonide 0.1% (KENALOG) 0.1 % ointment Apply topically 2 (two) times daily as needed (eczema). 453.6 g 0     No current facility-administered medications for this visit.       Labs:  Lab Results   Component Value Date    COLORU Yellow 2023    SPECGRAV 1.020 2023    PHUR 7.0 2023    WBCUR trace 2023    NITRITE neg 2023    PROTEINPOC 30 mg 2023    GLUCOSEUR neg " 06/05/2023    KETONESU neg 06/05/2023    UROBILINOGEN 1.0 06/05/2023    BILIRUBINPOC neg 06/05/2023    RBCUR neg 06/05/2023         Initial OB Labs ordered 4/18/23  - Blood Type and Rh: O pos  - Antibody Screen: neg  - CBC H/H: 11.7/35.2  - HIV: neg  - RPR: neg  - GC: neg  - CT: neg  - HBsAg: neg  - HCVAb: neg  - Rubella: immune  - Varicella: immune  - UA & Culture: NG  - Sickle Cell Screen: neg  - PAP: ASCUS, HRHPV positive. HPV 16, 18/45 negative.   - Influenza vaccine date: states did not get shot last season  - BTL desired: no    15-20 Weeks Lab ordered today (5/8/23)  - Quad Screen: Normal risk    28 Week Lab  - 1H GTT:   - Rhogam:   - Date of Tdap:   - CBC H/H:   - RPR:   - BTL consent:     37 Week Lab  - CBC H/H:   - RPR:   - GBS Culture:   - HIV:   - Cervical GC:     Imaging:   Initial US: 4/18/23: Single intrauterine pregnancy with a viable fetus with a fetal heart rate of 161 beats per minute.Estimated date of delivery October 20, 2023  Estimated age by ultrasound 13 weeks and 4 days +/-1 week 2 days.    Anatomy Scan: sched 6/6/23    Assessment:   1. 19 weeks gestation of pregnancy    2. ASCUS with positive high risk HPV cervical    3. Depression affecting pregnancy        Plan:  -OB Protocol  -PNVs  -Urine dip reviewed. 30mg protein, BP WNL, advised to improve hydration   -Routine labs reviewed  -Advised OTC Tylenol for Headaches  -Mother plans on feeding and postpartum contraception to be addressed at follow up  -Keep [facility] appt 6/6/2023  -Awaiting appt from [facility]  -Continue Therapy  -Labor and ED precautions discussed in depth. Strict ED Precautions: Fever, vaginal bleeding or leaking fluid, belly cramping or pain, regular contractions (q5-7min), shortness of breath-chest pain, swelling of the face-hands, ankles and feet or legs. Decreased fetal movement (don't feel the baby move in over an hour). Changes in vision. Severe headache that does not resolve with rest or Tylenol        - Return to [facility] FM  clinic in 4 weeks     Shirley Uriostegui MD  [employer] FM PGY-2

## 2023-06-06 ENCOUNTER — PROCEDURE VISIT (OUTPATIENT)
Dept: MATERNAL FETAL MEDICINE | Facility: CLINIC | Age: 23
End: 2023-06-06
Payer: MEDICAID

## 2023-06-06 DIAGNOSIS — Z3A.15 15 WEEKS GESTATION OF PREGNANCY: ICD-10-CM

## 2023-06-06 PROCEDURE — 76805 US MFM PROCEDURE (VIEWPOINT): ICD-10-PCS | Mod: S$GLB,,, | Performed by: OBSTETRICS & GYNECOLOGY

## 2023-06-06 PROCEDURE — 76805 OB US >/= 14 WKS SNGL FETUS: CPT | Mod: S$GLB,,, | Performed by: OBSTETRICS & GYNECOLOGY

## 2023-06-09 ENCOUNTER — PATIENT OUTREACH (OUTPATIENT)
Dept: FAMILY MEDICINE | Facility: CLINIC | Age: 23
End: 2023-06-09
Payer: MEDICAID

## 2023-06-13 NOTE — PROGRESS NOTES
.Follow-up: 7/3/23    Appointment reminder given for   7/3/23      Patient verbalized understanding.        Other comments:  Spoke to patient for f/u call. Went to ED for dental pain, left before being seen. Discussed she can go to walk in clinic at [facility] if the pain persists, discussed location and hours. Pt states she has a dentist but can't get in for a month. Pt sees [facility] OB clinic, states no bleeding, leaking, abd cramping. ED precautions discussed. Next f/u in OB [facility] is 7/5/23, will remind. Will also f/u on gyn referral.                   Education given on

## 2023-06-21 ENCOUNTER — HOSPITAL ENCOUNTER (EMERGENCY)
Facility: HOSPITAL | Age: 23
Discharge: HOME | End: 2023-06-21
Attending: FAMILY MEDICINE
Payer: MEDICAID

## 2023-06-21 VITALS
DIASTOLIC BLOOD PRESSURE: 68 MMHG | RESPIRATION RATE: 18 BRPM | WEIGHT: 121.25 LBS | HEIGHT: 63 IN | BODY MASS INDEX: 21.48 KG/M2 | SYSTOLIC BLOOD PRESSURE: 111 MMHG | OXYGEN SATURATION: 100 % | HEART RATE: 82 BPM | TEMPERATURE: 98 F

## 2023-06-21 DIAGNOSIS — N93.9 VAGINAL BLEEDING: ICD-10-CM

## 2023-06-21 DIAGNOSIS — B96.89 BACTERIAL VAGINOSIS IN PREGNANCY: Primary | ICD-10-CM

## 2023-06-21 DIAGNOSIS — O23.599 BACTERIAL VAGINOSIS IN PREGNANCY: Primary | ICD-10-CM

## 2023-06-21 LAB
ALBUMIN SERPL-MCNC: 3.2 G/DL (ref 3.5–5)
ALBUMIN/GLOB SERPL: 1 RATIO (ref 1.1–2)
ALP SERPL-CCNC: 51 UNIT/L (ref 40–150)
ALT SERPL-CCNC: 11 UNIT/L (ref 0–55)
APPEARANCE UR: CLEAR
AST SERPL-CCNC: 17 UNIT/L (ref 5–34)
BACTERIA #/AREA URNS AUTO: ABNORMAL /HPF
BASOPHILS # BLD AUTO: 0.04 X10(3)/MCL
BASOPHILS NFR BLD AUTO: 0.5 %
BILIRUB UR QL STRIP.AUTO: NEGATIVE MG/DL
BILIRUBIN DIRECT+TOT PNL SERPL-MCNC: 0.4 MG/DL
BUN SERPL-MCNC: 6.1 MG/DL (ref 7–18.7)
CALCIUM SERPL-MCNC: 8.8 MG/DL (ref 8.4–10.2)
CHLORIDE SERPL-SCNC: 108 MMOL/L (ref 98–107)
CLUE CELLS VAG QL WET PREP: ABNORMAL
CO2 SERPL-SCNC: 21 MMOL/L (ref 22–29)
COLOR UR: ABNORMAL
CREAT SERPL-MCNC: 0.58 MG/DL (ref 0.55–1.02)
EOSINOPHIL # BLD AUTO: 0.21 X10(3)/MCL (ref 0–0.9)
EOSINOPHIL NFR BLD AUTO: 2.8 %
ERYTHROCYTE [DISTWIDTH] IN BLOOD BY AUTOMATED COUNT: 13.2 % (ref 11.5–17)
GFR SERPLBLD CREATININE-BSD FMLA CKD-EPI: >60 MLS/MIN/1.73/M2
GLOBULIN SER-MCNC: 3.2 GM/DL (ref 2.4–3.5)
GLUCOSE SERPL-MCNC: 83 MG/DL (ref 74–100)
GLUCOSE UR QL STRIP.AUTO: NORMAL MG/DL
HCT VFR BLD AUTO: 32.8 % (ref 37–47)
HGB BLD-MCNC: 10.8 G/DL (ref 12–16)
HYALINE CASTS #/AREA URNS LPF: ABNORMAL /LPF
IMM GRANULOCYTES # BLD AUTO: 0.05 X10(3)/MCL (ref 0–0.04)
IMM GRANULOCYTES NFR BLD AUTO: 0.7 %
KETONES UR QL STRIP.AUTO: NEGATIVE MG/DL
LEUKOCYTE ESTERASE UR QL STRIP.AUTO: NEGATIVE UNIT/L
LYMPHOCYTES # BLD AUTO: 2.24 X10(3)/MCL (ref 0.6–4.6)
LYMPHOCYTES NFR BLD AUTO: 29.6 %
MCH RBC QN AUTO: 29.6 PG (ref 27–31)
MCHC RBC AUTO-ENTMCNC: 32.9 G/DL (ref 33–36)
MCV RBC AUTO: 89.9 FL (ref 80–94)
MONOCYTES # BLD AUTO: 0.54 X10(3)/MCL (ref 0.1–1.3)
MONOCYTES NFR BLD AUTO: 7.1 %
MUCOUS THREADS URNS QL MICRO: ABNORMAL /LPF
NEUTROPHILS # BLD AUTO: 4.5 X10(3)/MCL (ref 2.1–9.2)
NEUTROPHILS NFR BLD AUTO: 59.3 %
NITRITE UR QL STRIP.AUTO: NEGATIVE
NRBC BLD AUTO-RTO: 0 %
PH UR STRIP.AUTO: 6.5 [PH]
PLATELET # BLD AUTO: 208 X10(3)/MCL (ref 130–400)
PMV BLD AUTO: 12 FL (ref 7.4–10.4)
POTASSIUM SERPL-SCNC: 3.8 MMOL/L (ref 3.5–5.1)
PROT SERPL-MCNC: 6.4 GM/DL (ref 6.4–8.3)
PROT UR QL STRIP.AUTO: NEGATIVE MG/DL
RBC # BLD AUTO: 3.65 X10(6)/MCL (ref 4.2–5.4)
RBC #/AREA URNS AUTO: ABNORMAL /HPF
RBC UR QL AUTO: NEGATIVE UNIT/L
SODIUM SERPL-SCNC: 137 MMOL/L (ref 136–145)
SP GR UR STRIP.AUTO: 1.01
SQUAMOUS #/AREA URNS LPF: ABNORMAL /HPF
T VAGINALIS VAG QL WET PREP: ABNORMAL
UROBILINOGEN UR STRIP-ACNC: NORMAL MG/DL
WBC # SPEC AUTO: 7.58 X10(3)/MCL (ref 4.5–11.5)
WBC #/AREA URNS AUTO: ABNORMAL /HPF
WBC #/AREA VAG WET PREP: ABNORMAL
YEAST SPEC QL WET PREP: ABNORMAL

## 2023-06-21 PROCEDURE — 85025 COMPLETE CBC W/AUTO DIFF WBC: CPT | Performed by: FAMILY MEDICINE

## 2023-06-21 PROCEDURE — 80053 COMPREHEN METABOLIC PANEL: CPT | Performed by: FAMILY MEDICINE

## 2023-06-21 PROCEDURE — 99284 EMERGENCY DEPT VISIT MOD MDM: CPT | Mod: 25

## 2023-06-21 PROCEDURE — 87210 SMEAR WET MOUNT SALINE/INK: CPT | Performed by: FAMILY MEDICINE

## 2023-06-21 PROCEDURE — 81001 URINALYSIS AUTO W/SCOPE: CPT | Performed by: FAMILY MEDICINE

## 2023-06-21 RX ORDER — METRONIDAZOLE 500 MG/1
500 TABLET ORAL 2 TIMES DAILY
Qty: 14 TABLET | Refills: 0 | Status: SHIPPED | OUTPATIENT
Start: 2023-06-21 | End: 2023-06-28

## 2023-06-21 NOTE — ED PROVIDER NOTES
Encounter Date: 2023       History     Chief Complaint   Patient presents with    vaginal bleeding during pregnancy     Pt reports to the ed c/o vaginal bleeding and cramping (x)1 hour. Also states she is 22 weeks gestation. Kirstie martinez     23 y/o  with IUP at 22wks (EDC 10/25/23) presents emergency room complaints of vaginal bleeding.  Patient reports bleeding began approximately 1 hour ago with associated cramping.  Reports seeing blood in the toilet bowl as well as blood while wiping.  Patient reports routine prenatal care.  Patient was feeling normal state of health yesterday.    The history is provided by the patient.   Review of patient's allergies indicates:  No Known Allergies  Past Medical History:   Diagnosis Date    ASCUS with positive high risk HPV cervical 2023    Asthma     Mental disorder     depression and anxiety    Trauma      History reviewed. No pertinent surgical history.  Family History   Problem Relation Age of Onset    Seizures Father     Hyperlipidemia Mother      Social History     Tobacco Use    Smoking status: Former     Types: Vaping with nicotine     Quit date:      Years since quittin.4     Passive exposure: Current    Smokeless tobacco: Never   Substance Use Topics    Alcohol use: Not Currently     Comment: social    Drug use: Not Currently     Types: Marijuana     Comment: past     Review of Systems   Constitutional:  Negative for chills, fatigue and fever.   HENT:  Negative for ear pain, rhinorrhea and sore throat.    Eyes:  Negative for photophobia and pain.   Respiratory:  Negative for cough, shortness of breath and wheezing.    Cardiovascular:  Negative for chest pain.   Gastrointestinal:  Negative for abdominal pain, diarrhea, nausea and vomiting.   Genitourinary:  Positive for vaginal bleeding and vaginal discharge. Negative for dysuria.   Neurological:  Negative for dizziness, weakness and headaches.   All other systems reviewed and are negative.    Physical  Exam     Initial Vitals [06/21/23 0423]   BP Pulse Resp Temp SpO2   107/72 99 18 98.1 °F (36.7 °C) 99 %      MAP       --         Physical Exam    Nursing note and vitals reviewed.  Constitutional: She appears well-developed and well-nourished. No distress.   HENT:   Head: Normocephalic and atraumatic.   Eyes: Conjunctivae and EOM are normal. Pupils are equal, round, and reactive to light.   Neck: Neck supple.   Normal range of motion.  Cardiovascular:  Normal rate, regular rhythm and normal heart sounds.           Pulmonary/Chest: No respiratory distress. She has no wheezes. She has no rhonchi. She has no rales.   Abdominal: Abdomen is soft. Bowel sounds are normal. She exhibits no distension. There is no abdominal tenderness. There is no rebound and no guarding.   Genitourinary:    Pelvic exam was performed with patient prone.   Cervix exhibits no motion tenderness, no discharge and no friability.    Vaginal discharge present.      No vaginal tenderness or bleeding.   No tenderness or bleeding in the vagina.    Genitourinary Comments: Small amount white discharge noted within vaginal vault, appears to be more of a thickened physiologic discharge.  Normal cervix.  No evidence of any blood within the vaginal vault.  On bimanual examination, cervix is closed and thick, approximately 3-4 cm.  No cervical motion tenderness.     Musculoskeletal:         General: Normal range of motion.      Cervical back: Normal range of motion and neck supple.     Neurological: She is alert and oriented to person, place, and time.   Skin: Skin is warm and dry. Capillary refill takes less than 2 seconds. No erythema.   Psychiatric: She has a normal mood and affect. Her behavior is normal. Judgment and thought content normal.       ED Course   ED US Pelvis OB    Date/Time: 6/21/2023 5:00 AM  Performed by: Amado Guan MD  Authorized by: Amado Guan MD     Indication:  Pregnancy and Vaginal bleeding  Identified  Structures:  Uterus sagittal  Definitive IUP:  Present  Uterine Contents:  Fetus  (bpm):  141  Impression:  IUP  Charge?:  Yes  Labs Reviewed   WET PREP, GENITAL - Abnormal; Notable for the following components:       Result Value    WBC, Wet Prep Few (*)     Clue Cells, Wet Prep Moderate (*)     All other components within normal limits   COMPREHENSIVE METABOLIC PANEL - Abnormal; Notable for the following components:    Chloride 108 (*)     Carbon Dioxide 21 (*)     Blood Urea Nitrogen 6.1 (*)     Albumin Level 3.2 (*)     Albumin/Globulin Ratio 1.0 (*)     All other components within normal limits   URINALYSIS, REFLEX TO URINE CULTURE - Abnormal; Notable for the following components:    Bacteria, UA Trace (*)     Squamous Epithelial Cells, UA Few (*)     Mucous, UA Trace (*)     All other components within normal limits   CBC WITH DIFFERENTIAL - Abnormal; Notable for the following components:    RBC 3.65 (*)     Hgb 10.8 (*)     Hct 32.8 (*)     MCHC 32.9 (*)     MPV 12.0 (*)     IG# 0.05 (*)     All other components within normal limits   CBC W/ AUTO DIFFERENTIAL    Narrative:     The following orders were created for panel order CBC Auto Differential.  Procedure                               Abnormality         Status                     ---------                               -----------         ------                     CBC with Differential[[id number]]        Abnormal            Final result                 Please view results for these tests on the individual orders.          Imaging Results              US OB 14+ Wks, TransAbd, Single Gestation (In process)  Result time 06/21/23 06:02:06                     Medications - No data to display  Medical Decision Making:   Initial Assessment:   Patient is a 22-year-old female presents emergency room with complaints of vaginal bleeding and cramping for approximately 1 hour.  On physical examination, no blood noted within the vaginal vault, only patient with thick  mucus, non foul smelling.  No discoloration of the mucous.  Patient does have a small papule noted at the 12 o'clock position lithotomy position at the rectal region, possibly representing a small hemorrhoid.  No active bleeding appreciated from the hemorrhoid or from around the rectal area.  Cervix itself is closed without signs of irritation or bleeding from the cervical os.  Patient overall appears well.  Will obtain a bedside ultrasound evaluation to assess for he fetal heart tones and fetal movement.  Due to vaginal discharge, will send a wet prep for further evaluation.  Differential Diagnosis:   Threatened , nonspecific pelvic pain and pregnancy, placental insufficiency, subchorionic hemorrhage, placental abruption, fetal demise           ED Course as of 23 0643      0505 On ultrasound examination, good fetal heart tones, good fetal movement. [MW]   0525 Sodium: 137 [MW]   0525 Potassium: 3.8 [MW]   0525 Chloride(!): 108 [MW]   0525 CO2(!): 21 [MW]   0525 Glucose: 83 [MW]   0525 BUN(!): 6.1 [MW]   0525 Creatinine: 0.58 [MW]   0525 Calcium: 8.8 [MW]   0525 PROTEIN TOTAL: 6.4 [MW]   0525 Albumin(!): 3.2 [MW]   0526 Globulin, Total: 3.2 [MW]   0526 Albumin/Globulin Ratio(!): 1.0 [MW]   0526 BILIRUBIN TOTAL: 0.4 [MW]   0526 Alkaline Phosphatase: 51 [MW]   0526 ALT: 11 [MW]   0526 AST: 17 [MW]   0526 eGFR: >60 [MW]   0526 WBC: 7.58 [MW]   0526 Hemoglobin(!): 10.8 [MW]   0526 Hematocrit(!): 32.8 [MW]   0526 Platelets: 208 [MW]   0526 Clue Cells, Wet Prep(!): Moderate [MW]   0526 WBC - Vaginal Screen(!): Few [MW]   0538 On laboratory evaluation, noted to have clue cells.  Awaiting on obtaining ultrasound to evaluate placenta.  Patient reports complete resolution of abdominal cramping. [MW]   0631 Color, UA: Light-Yellow [MW]   0631 Appearance, UA: Clear [MW]   0631 Specific Gravity,UA: 1.014 [MW]   0631 pH, UA: 6.5 [MW]   0631 Bacteria, UA(!): Trace [MW]   0631 Squamous Epithelial  Cells, UA(!): Few [MW]   0631 Mucous, UA(!): Trace [MW]   0631 Hyaline Casts, UA: None Seen [MW]   0631 WBC, UA: 0-5 [MW]   0642 No acute abnormality noted on Ultrasound.  Stable for discharge to home.  ER precautions for any acute worsening. [MW]      ED Course User Index  [MW] Amado Guan MD                 Clinical Impression:   Final diagnoses:  [N93.9] Vaginal bleeding  [O23.599, B96.89] Bacterial vaginosis in pregnancy (Primary)        ED Disposition Condition    Discharge Stable          ED Prescriptions       Medication Sig Dispense Start Date End Date Auth. Provider    metroNIDAZOLE (FLAGYL) 500 MG tablet Take 1 tablet (500 mg total) by mouth 2 (two) times a day. for 7 days 14 tablet 6/21/2023 6/28/2023 Amado Guan MD          Follow-up Information       Follow up With Specialties Details Why Contact Info    [facility] Emergency Medicine  As needed, If symptoms worsen [address] [facility] [address] [phone]    OBALONSO Guan MD  06/21/23 2634

## 2023-06-21 NOTE — ED NOTES
Here for eval of pelvic cramping and vag bleeding.  G1 .States IUP 22 weeks. Woke needing to void. After voiding, saw blood on tissue then cramping began. Rates pain 5/10. Is currently wearing pad #1.

## 2023-07-03 ENCOUNTER — PATIENT OUTREACH (OUTPATIENT)
Dept: FAMILY MEDICINE | Facility: CLINIC | Age: 23
End: 2023-07-03
Payer: MEDICAID

## 2023-07-05 ENCOUNTER — PATIENT MESSAGE (OUTPATIENT)
Dept: RESEARCH | Facility: HOSPITAL | Age: 23
End: 2023-07-05
Payer: MEDICAID

## 2023-07-05 ENCOUNTER — OFFICE VISIT (OUTPATIENT)
Dept: FAMILY MEDICINE | Facility: CLINIC | Age: 23
End: 2023-07-05
Payer: MEDICAID

## 2023-07-05 VITALS
TEMPERATURE: 98 F | WEIGHT: 128 LBS | RESPIRATION RATE: 16 BRPM | OXYGEN SATURATION: 100 % | DIASTOLIC BLOOD PRESSURE: 65 MMHG | HEIGHT: 62 IN | SYSTOLIC BLOOD PRESSURE: 102 MMHG | HEART RATE: 73 BPM | BODY MASS INDEX: 23.55 KG/M2

## 2023-07-05 DIAGNOSIS — R87.810 ASCUS WITH POSITIVE HIGH RISK HPV CERVICAL: ICD-10-CM

## 2023-07-05 DIAGNOSIS — O99.340 DEPRESSION AFFECTING PREGNANCY: ICD-10-CM

## 2023-07-05 DIAGNOSIS — R87.610 ASCUS WITH POSITIVE HIGH RISK HPV CERVICAL: ICD-10-CM

## 2023-07-05 DIAGNOSIS — Z3A.24 24 WEEKS GESTATION OF PREGNANCY: Primary | ICD-10-CM

## 2023-07-05 DIAGNOSIS — F32.A DEPRESSION AFFECTING PREGNANCY: ICD-10-CM

## 2023-07-05 LAB
BILIRUB SERPL-MCNC: NORMAL MG/DL
BLOOD URINE, POC: NORMAL
CLARITY, POC UA: CLEAR
COLOR, POC UA: YELLOW
GLUCOSE UR QL STRIP: NORMAL
KETONES UR QL STRIP: NORMAL
LEUKOCYTE ESTERASE URINE, POC: NORMAL
NITRITE, POC UA: NORMAL
PH, POC UA: 7
PROTEIN, POC: NORMAL
SPECIFIC GRAVITY, POC UA: 1.02
UROBILINOGEN, POC UA: 0.2

## 2023-07-05 PROCEDURE — 81002 URINALYSIS NONAUTO W/O SCOPE: CPT | Mod: PBBFAC | Performed by: STUDENT IN AN ORGANIZED HEALTH CARE EDUCATION/TRAINING PROGRAM

## 2023-07-05 PROCEDURE — 99214 OFFICE O/P EST MOD 30 MIN: CPT | Mod: PBBFAC | Performed by: STUDENT IN AN ORGANIZED HEALTH CARE EDUCATION/TRAINING PROGRAM

## 2023-07-05 NOTE — PROGRESS NOTES
[facility]    Subjective     Patient ID: Shakir Llanos is a 22 y.o. female.    Chief Complaint: Routine Prenatal Visit (Patient presented to ED for vaginal bleeding. [id number] pregnant)    Shakir Llanos is a 22 y.o. female   20w0d 10/25/2023, by LMP and consistent with US presenting to [facility] for routine OB visit.    Current Issues: No complaints. Taking PNV daily. Completing Flagyl as given by ED. Seen in ED for vaginal bleeding that has resolved.     Chronic Issues:   Depression diagnosed , took Lexapro until . Self-discontinued Seeing therapist (Ms Calix Destiney) every 2 weeks .  No SI/HI. Has good social support      Gestational History: .        OB Review of Systems:  Fetal Movements: yes  Vaginal bleeding: None  Leakage of Fluid: None   Vaginal Discharge: None  Headaches: None  Lower Extremity Edema: None  Vision Changes: None  Contractions: None    Constitutional: no fever, no chills, no weight loss  CV: no swelling, no edema, no chest pain  : denies dysuria  GI:  no constipation, no diarrhea no vomiting  RESP: no SOB, no wheezing, no difficulty breathing  Psych: no depression, no anxiety         Objective   Vitals:    23 0830   BP: 102/65   Pulse: 73   Resp: 16   Temp: 97.9 °F (36.6 °C)       Physical Exam:  General: in no acute distress  RESP: clear to auscultation bilaterally, non labored  CV: regular rate and rhythm, no murmurs, no edema  ABD: gravid, nontender, BS+  FHTs: 145 bpm by doppler  Fundal height: 24 cm         Initial OB Labs ordered 23  - Blood Type and Rh: O pos  - Antibody Screen: neg  - CBC H/H: 11.7/35.2  - HIV: neg  - RPR: neg  - GC: neg  - CT: neg  - HBsAg: neg  - HCVAb: neg  - Rubella: immune  - Varicella: immune  - UA & Culture: NG  - Sickle Cell Screen: neg  - PAP: ASCUS, HRHPV positive. HPV 16, 18/45 negative.   - Influenza vaccine date: states did not get shot last season  - BTL desired: no    15-20 Weeks Lab ordered  today (23)  - Quad Screen: Normal risk    28 Week Lab  - 1H GTT:   - Rhogam:   - Date of Tdap:   - CBC H/H:   - RPR:   - BTL consent:     37 Week Lab  - CBC H/H:   - RPR:   - GBS Culture:   - HIV:   - Cervical GC:      Imaging:   Initial US: 23: Single intrauterine pregnancy with a viable fetus with a fetal heart rate of 161 beats per minute.Estimated date of delivery 2023  Estimated age by ultrasound 13 weeks and 4 days +/-1 week 2 days.    FAS 23- Impression: jacobsen living IUP is identified.   Fetal size is appropriate for established dating. No fetal structural malformations are identified. Cervical length by TA scanning is normal. Placental location is posterior without evidence of previa.     US in ED / vaginal bleedin23   - Single live intrauterine pregnancy with heart rate of 145 bpm and AUA of 22 weeks.      Lab Results   Component Value Date    COLORU Yellow 2023    SPECGRAV 1.020 2023    PHUR 7.0 2023    WBCUR neg 2023    NITRITE neg 2023    PROTEINPOC neg 2023    GLUCOSEUR neg 2023    KETONESU neg 2023    UROBILINOGEN 0.2 2023    BILIRUBINPOC neg 2023    RBCUR neg 2023          Assessment and Plan     1. 24 weeks gestation of pregnancy  -     POCT URINE DIPSTICK WITHOUT MICROSCOPE    2. ASCUS with positive high risk HPV cervical    3. Depression affecting pregnancy        -OB Protocol  -PNVs  -Urine dip reviewed.   -Mother plans on feeding  - Contraception: Nexplanon  -Awaiting appt from [facility]  -Continue Therapy  -Labor and ED precautions discussed in depth. Strict ED Precautions: Fever, vaginal bleeding or leaking fluid, belly cramping or pain, regular contractions (q5-7min), shortness of breath-chest pain, swelling of the face-hands, ankles and feet or legs. Decreased fetal movement (don't feel the baby move in over an hour). Changes in vision. Severe headache that does not resolve with rest or  Tylenol      RTC in 4 weeks for routine     Justine Craig M.D.  [facility] HO-3         Follow up in about 4 weeks (around 8/2/2023).

## 2023-07-11 ENCOUNTER — PATIENT MESSAGE (OUTPATIENT)
Dept: RESEARCH | Facility: HOSPITAL | Age: 23
End: 2023-07-11
Payer: MEDICAID

## 2023-07-24 ENCOUNTER — OFFICE VISIT (OUTPATIENT)
Dept: FAMILY MEDICINE | Facility: CLINIC | Age: 23
End: 2023-07-24
Payer: MEDICAID

## 2023-07-24 VITALS
WEIGHT: 132.81 LBS | OXYGEN SATURATION: 99 % | BODY MASS INDEX: 24.44 KG/M2 | HEART RATE: 84 BPM | DIASTOLIC BLOOD PRESSURE: 69 MMHG | SYSTOLIC BLOOD PRESSURE: 104 MMHG | HEIGHT: 62 IN | TEMPERATURE: 98 F

## 2023-07-24 DIAGNOSIS — O99.340 DEPRESSION AFFECTING PREGNANCY: ICD-10-CM

## 2023-07-24 DIAGNOSIS — F32.A DEPRESSION AFFECTING PREGNANCY: ICD-10-CM

## 2023-07-24 DIAGNOSIS — Z3A.26 26 WEEKS GESTATION OF PREGNANCY: Primary | ICD-10-CM

## 2023-07-24 DIAGNOSIS — R87.810 ASCUS WITH POSITIVE HIGH RISK HPV CERVICAL: ICD-10-CM

## 2023-07-24 DIAGNOSIS — R87.610 ASCUS WITH POSITIVE HIGH RISK HPV CERVICAL: ICD-10-CM

## 2023-07-24 LAB
BILIRUB SERPL-MCNC: ABNORMAL MG/DL
BLOOD URINE, POC: ABNORMAL
CLARITY, POC UA: ABNORMAL
COLOR, POC UA: ABNORMAL
GLUCOSE UR QL STRIP: ABNORMAL
KETONES UR QL STRIP: ABNORMAL
LEUKOCYTE ESTERASE URINE, POC: ABNORMAL
NITRITE, POC UA: ABNORMAL
PH, POC UA: 8.5
PROTEIN, POC: ABNORMAL
SPECIFIC GRAVITY, POC UA: ABNORMAL
UROBILINOGEN, POC UA: 0.2

## 2023-07-24 PROCEDURE — 99213 OFFICE O/P EST LOW 20 MIN: CPT | Mod: PBBFAC | Performed by: STUDENT IN AN ORGANIZED HEALTH CARE EDUCATION/TRAINING PROGRAM

## 2023-07-24 PROCEDURE — 81002 URINALYSIS NONAUTO W/O SCOPE: CPT | Mod: PBBFAC | Performed by: STUDENT IN AN ORGANIZED HEALTH CARE EDUCATION/TRAINING PROGRAM

## 2023-07-24 NOTE — LETTER
July 24, 2023    Shakir Llanos  [address]  [place] [address] [facility]  Family Medicine  [address]  [facility] [facility] [id number]  Phone: [phone]   July 24, 2023     Patient: Shakir Llanos   YOB: 2000   Date of Visit: 7/24/2023       To Whom it May Concern:    Shakir Llanos was seen in my clinic on 7/24/2023. She may return to work on 07/25/2023 .    Please excuse her from any classes or work missed.    If you have any questions or concerns, please don't hesitate to call.    Sincerely,         Justine Craig MD

## 2023-07-24 NOTE — PROGRESS NOTES
[facility]    Subjective     Patient ID: Shakir Llanos is a 22 y.o. female.    Chief Complaint: Routine Prenatal Visit (Ob 26^5)    Shakir Llanos is a 22 y.o. female   26w5d  with BENITO 10/25/2023 by LMP; consistent with US presenting to [facility] visit.    Current Issues: No complaints. Taking PNV daily.    Chronic Issues:   Depression diagnosed , took Lexapro until . Self-discontinued medications. Seeing therapist (Ms Fifi Cabello) every 2 weeks .  No SI/HI. Has good social support      Gestational History: .          OB Review of Systems:  Fetal Movements: yes  Vaginal bleeding: None  Leakage of Fluid: None   Vaginal Discharge: None  Headaches: None  Lower Extremity Edema: None  Vision Changes: None  Contractions: None    Constitutional: no fever, no chills, no weight loss  CV: no swelling, no edema, no chest pain  : denies dysuria  GI:  no constipation, no diarrhea no vomiting  RESP: no SOB, no wheezing, no difficulty breathing  Psych: no depression, no anxiety       Objective   Vitals:    23 1448   BP: 104/69   Pulse: 84   Temp: 98.1 °F (36.7 °C)       Physical Exam  General: in no acute distress  RESP: clear to auscultation bilaterally, non labored  CV: regular rate and rhythm, no murmurs, no edema  ABD: gravid, nontender, BS+  FHTs:  140-145 bpm by doppler  Fundal height: 26 cm           Initial OB Labs ordered 23  - Blood Type and Rh: O pos  - Antibody Screen: neg  - CBC H/H: 11.7/35.2  - HIV: neg  - RPR: neg  - GC: neg  - CT: neg  - HBsAg: neg  - HCVAb: neg  - Rubella: immune  - Varicella: immune  - UA & Culture: NG  - Sickle Cell Screen: neg  - PAP: ASCUS, HRHPV positive. HPV 16, 18/45 negative.   - Influenza vaccine date: states did not get shot last season  - BTL desired: no    15-20 Weeks Lab ordered today (23)  - Quad Screen: Normal risk    28 Week Lab  - 1H GTT:   - Rhogam:   - Date of Tdap:   - CBC H/H:   - RPR:   - BTL  consent:     37 Week Lab  - CBC H/H:   - RPR:   - GBS Culture:   - HIV:   - Cervical GC:          Imaging:   Initial US: 23: Single intrauterine pregnancy with a viable fetus with a fetal heart rate of 161 beats per minute.Estimated date of delivery 2023  Estimated age by ultrasound 13 weeks and 4 days +/-1 week 2 days.     FAS 23- Impression: jacobsen living IUP is identified.   Fetal size is appropriate for established dating. No fetal structural malformations are identified. Cervical length by TA scanning is normal. Placental location is posterior without evidence of previa.      US in ED / vaginal bleedin23   - Single live intrauterine pregnancy with heart rate of 145 bpm and AUA of 22 weeks.       Lab Results   Component Value Date    COLORU Light Yellow 2023    SPECGRAV 1.020 2023    PHUR 8.5 2023    WBCUR trace 2023    NITRITE neg 2023    PROTEINPOC neg 2023    GLUCOSEUR neg (NG) 2023    KETONESU neg 2023    UROBILINOGEN 0.2 2023    BILIRUBINPOC neg 2023    RBCUR neg 2023            Assessment and Plan     1. 26 weeks gestation of pregnancy  -     POCT URINE DIPSTICK WITHOUT MICROSCOPE    2. Depression affecting pregnancy    3. ASCUS with positive high risk HPV cervical      26 weeks gestation of pregnancy  -OB Protocol  -PNVs  -Urine dip reviewed.   -Mother plans on breast feeding  - Contraception: Considering Nexplanon  -Labor and ED precautions discussed in depth. Strict ED Precautions: Fever, vaginal bleeding or leaking fluid, belly cramping or pain, regular contractions (q5-7min), shortness of breath-chest pain, swelling of the face-hands, ankles and feet or legs. Decreased fetal movement (don't feel the baby move in over an hour). Changes in vision. Severe headache that does not resolve with rest or Tylenol    Depression  - Continue therapy  - Monitor mood closely.     ASCUS w/ HPV +  - Awaiting appt from  [facility] in regards to ASCUS      RTC in 2 weeks for 28 week routine OB    Justine Craig M.D.  [facility] HO-3         Follow up in about 2 weeks (around 8/7/2023).

## 2023-07-27 ENCOUNTER — PATIENT OUTREACH (OUTPATIENT)
Dept: FAMILY MEDICINE | Facility: CLINIC | Age: 23
End: 2023-07-27
Payer: MEDICAID

## 2023-07-27 NOTE — PROGRESS NOTES
.Follow-up: 8/27/23    Appointment reminder given for         Patient verbalized understanding.        Other comments:      Spoke to patient for OB f/u call. No new issues. Has not seen dentist yet, encouraged to schedule. Discussed using walk in clinic behind [facility] ED if tooth pain flares up. ED precautions given for pregnancy. Denies bleeding/leaking/cramping. Will f.u. Aware of appt 8/7/23 and states will attend.               Education given on   ED precautions, where to receive care.

## 2023-08-07 ENCOUNTER — OFFICE VISIT (OUTPATIENT)
Dept: FAMILY MEDICINE | Facility: CLINIC | Age: 23
End: 2023-08-07
Payer: MEDICAID

## 2023-08-07 VITALS
BODY MASS INDEX: 24.99 KG/M2 | HEART RATE: 78 BPM | SYSTOLIC BLOOD PRESSURE: 115 MMHG | WEIGHT: 135.81 LBS | HEIGHT: 62 IN | DIASTOLIC BLOOD PRESSURE: 76 MMHG | OXYGEN SATURATION: 99 % | RESPIRATION RATE: 18 BRPM | TEMPERATURE: 98 F

## 2023-08-07 DIAGNOSIS — F32.A DEPRESSION AFFECTING PREGNANCY: ICD-10-CM

## 2023-08-07 DIAGNOSIS — Z3A.28 28 WEEKS GESTATION OF PREGNANCY: Primary | ICD-10-CM

## 2023-08-07 DIAGNOSIS — R87.610 ASCUS WITH POSITIVE HIGH RISK HPV CERVICAL: ICD-10-CM

## 2023-08-07 DIAGNOSIS — R87.810 ASCUS WITH POSITIVE HIGH RISK HPV CERVICAL: ICD-10-CM

## 2023-08-07 DIAGNOSIS — O99.340 DEPRESSION AFFECTING PREGNANCY: ICD-10-CM

## 2023-08-07 LAB
BASOPHILS # BLD AUTO: 0.04 X10(3)/MCL
BASOPHILS NFR BLD AUTO: 0.6 %
BILIRUB SERPL-MCNC: NORMAL MG/DL
BLOOD URINE, POC: NORMAL
CLARITY, POC UA: CLEAR
COLOR, POC UA: YELLOW
EOSINOPHIL # BLD AUTO: 0.18 X10(3)/MCL (ref 0–0.9)
EOSINOPHIL NFR BLD AUTO: 2.6 %
ERYTHROCYTE [DISTWIDTH] IN BLOOD BY AUTOMATED COUNT: 13.2 % (ref 11.5–17)
GLUCOSE 1H P 100 G GLC PO SERPL-MCNC: 92 MG/DL (ref 100–180)
GLUCOSE UR QL STRIP: NORMAL
HCT VFR BLD AUTO: 33.7 % (ref 37–47)
HGB BLD-MCNC: 10.9 G/DL (ref 12–16)
IMM GRANULOCYTES # BLD AUTO: 0.09 X10(3)/MCL (ref 0–0.04)
IMM GRANULOCYTES NFR BLD AUTO: 1.3 %
KETONES UR QL STRIP: NORMAL
LEUKOCYTE ESTERASE URINE, POC: NORMAL
LYMPHOCYTES # BLD AUTO: 1.52 X10(3)/MCL (ref 0.6–4.6)
LYMPHOCYTES NFR BLD AUTO: 21.7 %
MCH RBC QN AUTO: 29.5 PG (ref 27–31)
MCHC RBC AUTO-ENTMCNC: 32.3 G/DL (ref 33–36)
MCV RBC AUTO: 91.3 FL (ref 80–94)
MONOCYTES # BLD AUTO: 0.44 X10(3)/MCL (ref 0.1–1.3)
MONOCYTES NFR BLD AUTO: 6.3 %
NEUTROPHILS # BLD AUTO: 4.73 X10(3)/MCL (ref 2.1–9.2)
NEUTROPHILS NFR BLD AUTO: 67.5 %
NITRITE, POC UA: NORMAL
NRBC BLD AUTO-RTO: 0 %
PH, POC UA: 7
PLATELET # BLD AUTO: 173 X10(3)/MCL (ref 130–400)
PMV BLD AUTO: 12.9 FL (ref 7.4–10.4)
PROTEIN, POC: NORMAL
RBC # BLD AUTO: 3.69 X10(6)/MCL (ref 4.2–5.4)
SPECIFIC GRAVITY, POC UA: 1.01
T PALLIDUM AB SER QL: NONREACTIVE
UROBILINOGEN, POC UA: 0.2
WBC # SPEC AUTO: 7 X10(3)/MCL (ref 4.5–11.5)

## 2023-08-07 PROCEDURE — 86780 TREPONEMA PALLIDUM: CPT | Performed by: STUDENT IN AN ORGANIZED HEALTH CARE EDUCATION/TRAINING PROGRAM

## 2023-08-07 PROCEDURE — 90471 IMMUNIZATION ADMIN: CPT | Mod: PBBFAC

## 2023-08-07 PROCEDURE — 82950 GLUCOSE TEST: CPT | Performed by: STUDENT IN AN ORGANIZED HEALTH CARE EDUCATION/TRAINING PROGRAM

## 2023-08-07 PROCEDURE — 85025 COMPLETE CBC W/AUTO DIFF WBC: CPT | Performed by: STUDENT IN AN ORGANIZED HEALTH CARE EDUCATION/TRAINING PROGRAM

## 2023-08-07 PROCEDURE — 81002 URINALYSIS NONAUTO W/O SCOPE: CPT | Mod: PBBFAC | Performed by: STUDENT IN AN ORGANIZED HEALTH CARE EDUCATION/TRAINING PROGRAM

## 2023-08-07 PROCEDURE — 90715 TDAP VACCINE 7 YRS/> IM: CPT | Mod: PBBFAC

## 2023-08-07 PROCEDURE — 36415 COLL VENOUS BLD VENIPUNCTURE: CPT | Performed by: STUDENT IN AN ORGANIZED HEALTH CARE EDUCATION/TRAINING PROGRAM

## 2023-08-07 PROCEDURE — 99213 OFFICE O/P EST LOW 20 MIN: CPT | Mod: PBBFAC | Performed by: STUDENT IN AN ORGANIZED HEALTH CARE EDUCATION/TRAINING PROGRAM

## 2023-08-07 RX ADMIN — TETANUS TOXOID, REDUCED DIPHTHERIA TOXOID AND ACELLULAR PERTUSSIS VACCINE, ADSORBED 0.5 ML: 5; 2.5; 8; 8; 2.5 SUSPENSION INTRAMUSCULAR at 09:08

## 2023-08-07 NOTE — PROGRESS NOTES
[facility]    Subjective     Patient ID: Shakir Llanos is a 22 y.o. female.    Chief Complaint: Routine Prenatal Visit (Ob 28 weeks 5 days)    Shakir Llanos is a 22 y.o. female   28w5d  with BENITO 10/25/2023 by LMP; consistent with US presenting to [facility] for routine OB visit.    Current Issues: No complaints. Taking PNV daily.    Chronic Issues:   Depression diagnosed , took Lexapro until . Self-discontinued medications. Seeing therapist (Ms Fifi Cabello) every 2 weeks .  No SI/HI. Has good social support      Gestational History: .          OB Review of Systems:  Fetal Movements: yes  Vaginal bleeding: None  Leakage of Fluid: None   Vaginal Discharge: None  Headaches: None  Lower Extremity Edema: None  Vision Changes: None  Contractions: None    Constitutional: no fever, no chills, no weight loss  CV: no swelling, no edema, no chest pain  : denies dysuria  GI:  no constipation, no diarrhea no vomiting  RESP: no SOB, no wheezing, no difficulty breathing  Psych: no depression, no anxiety            Objective   Vitals:    23 0838   BP: 115/76   Pulse: 78   Resp: 18   Temp: 97.9 °F (36.6 °C)         Physical Exam  General: in no acute distress  RESP: clear to auscultation bilaterally, non labored  CV: regular rate and rhythm, no murmurs, no edema  ABD: gravid, nontender, BS+  FHTs:  150 bpm by doppler  Fundal height: 28 cm           Initial OB Labs ordered 23  - Blood Type and Rh: O pos  - Antibody Screen: neg  - CBC H/H: 11.7/35.2  - HIV: neg  - RPR: neg  - GC: neg  - CT: neg  - HBsAg: neg  - HCVAb: neg  - Rubella: immune  - Varicella: immune  - UA & Culture: NG  - Sickle Cell Screen: neg  - PAP: ASCUS, HRHPV positive. HPV 16, 18/45 negative.   - Influenza vaccine date: states did not get shot last season  - BTL desired: no    15-20 Weeks Lab ordered today (23)  - Quad Screen: Normal risk    28 Week Lab ordered 23  - 1H GTT:   - Rhogam:   - Date  of Tdap: 23  - CBC H/H:   - RPR:   - BTL consent: declined- Nexplanon    37 Week Lab  - CBC H/H:   - RPR:   - GBS Culture:   - HIV:   - Cervical GC:         Imaging:   Initial US: 23: Single intrauterine pregnancy with a viable fetus with a fetal heart rate of 161 beats per minute.Estimated date of delivery 2023  Estimated age by ultrasound 13 weeks and 4 days +/-1 week 2 days.     FAS 23- Impression: jacobsen living IUP is identified.   Fetal size is appropriate for established dating. No fetal structural malformations are identified. Cervical length by TA scanning is normal. Placental location is posterior without evidence of previa.      US in ED / vaginal bleedin23   - Single live intrauterine pregnancy with heart rate of 145 bpm and AUA of 22 weeks.          Lab Results   Component Value Date    COLORU Yellow 2023    SPECGRAV 1.010 2023    PHUR 7.0 2023    WBCUR small 2023    NITRITE neg 2023    PROTEINPOC neg 2023    GLUCOSEUR neg 2023    KETONESU neg 2023    UROBILINOGEN 0.2 2023    BILIRUBINPOC neg 2023    RBCUR neg 2023              Assessment and Plan     1. 28 weeks gestation of pregnancy  -     SYPHILIS ANTIBODY (WITH REFLEX RPR)  -     CBC Auto Differential  -     Glucose Tolerance 1 Hour  -     POCT URINE DIPSTICK WITHOUT MICROSCOPE    2. Depression affecting pregnancy    3. ASCUS with positive high risk HPV cervical    Other orders  -     Discontinue: Tdap (BOOSTRIX) vaccine injection 0.5 mL  -     Discontinue: Tdap (BOOSTRIX) vaccine injection 0.5 mL  -     Tdap (BOOSTRIX) vaccine injection 0.5 mL  -     Discontinue: Tdap (BOOSTRIX) vaccine injection 0.5 mL        28 weeks gestation of pregnancy  -OB Protocol  -PNVs  -Urine dip reviewed.   -Mother plans on breast feeding  - Contraception: Considering Nexplanon  -Labor and ED precautions discussed in depth. Strict ED Precautions: Fever, vaginal  bleeding or leaking fluid, belly cramping or pain, regular contractions (q5-7min), shortness of breath-chest pain, swelling of the face-hands, ankles and feet or legs. Decreased fetal movement (don't feel the baby move in over an hour). Changes in vision. Severe headache that does not resolve with rest or Tylenol     Depression  - Continue therapy  - Monitor mood closely.      ASCUS w/ HPV +  - repeat pap on PP visit  - Awaiting appt from [facility] in regards to ASCUS        RTC in 2 weeks routine OB         Justine Craig M.D.  [facility] HO-3         Follow up in about 2 weeks (around 8/21/2023).

## 2023-08-14 NOTE — PROGRESS NOTES
Faculty addendum: Patient discussed with resident. Chart was reviewed including vitals, labs, etc. Care provided reasonable and necessary. I participated in the management of the patient and was immediately available throughout the encounter. Services were furnished in a primary care center located in the outpatient department of a [facility] hospital. I agree with the resident's findings and plan as documented in the resident's note.

## 2023-08-15 ENCOUNTER — HOSPITAL ENCOUNTER (EMERGENCY)
Facility: HOSPITAL | Age: 23
Discharge: HOME | End: 2023-08-15
Attending: OBSTETRICS & GYNECOLOGY
Payer: MEDICAID

## 2023-08-15 VITALS
DIASTOLIC BLOOD PRESSURE: 59 MMHG | RESPIRATION RATE: 18 BRPM | OXYGEN SATURATION: 100 % | SYSTOLIC BLOOD PRESSURE: 104 MMHG | TEMPERATURE: 98 F | HEART RATE: 83 BPM

## 2023-08-15 LAB
AMPHET UR QL SCN: NEGATIVE
APPEARANCE UR: CLEAR
BACTERIA #/AREA URNS AUTO: ABNORMAL /HPF
BARBITURATE SCN PRESENT UR: NEGATIVE
BASOPHILS # BLD AUTO: 0.02 X10(3)/MCL
BASOPHILS NFR BLD AUTO: 0.3 %
BENZODIAZ UR QL SCN: NEGATIVE
BILIRUB UR QL STRIP.AUTO: NEGATIVE
C TRACH DNA SPEC QL NAA+PROBE: NOT DETECTED
CANNABINOIDS UR QL SCN: POSITIVE
COCAINE UR QL SCN: NEGATIVE
COLOR UR: YELLOW
EOSINOPHIL # BLD AUTO: 0.13 X10(3)/MCL (ref 0–0.9)
EOSINOPHIL NFR BLD AUTO: 1.8 %
ERYTHROCYTE [DISTWIDTH] IN BLOOD BY AUTOMATED COUNT: 13.4 % (ref 11.5–17)
FENTANYL UR QL SCN: NEGATIVE
GLUCOSE UR QL STRIP.AUTO: NEGATIVE
GROUP & RH: NORMAL
HCT VFR BLD AUTO: 30.8 % (ref 37–47)
HGB BLD-MCNC: 10.2 G/DL (ref 12–16)
IMM GRANULOCYTES # BLD AUTO: 0.06 X10(3)/MCL (ref 0–0.04)
IMM GRANULOCYTES NFR BLD AUTO: 0.8 %
INDIRECT COOMBS GEL: NORMAL
KETONES UR QL STRIP.AUTO: NEGATIVE
LEUKOCYTE ESTERASE UR QL STRIP.AUTO: NEGATIVE
LYMPHOCYTES # BLD AUTO: 1.28 X10(3)/MCL (ref 0.6–4.6)
LYMPHOCYTES NFR BLD AUTO: 17.6 %
MCH RBC QN AUTO: 30.2 PG (ref 27–31)
MCHC RBC AUTO-ENTMCNC: 33.1 G/DL (ref 33–36)
MCV RBC AUTO: 91.1 FL (ref 80–94)
MDMA UR QL SCN: NEGATIVE
MONOCYTES # BLD AUTO: 0.39 X10(3)/MCL (ref 0.1–1.3)
MONOCYTES NFR BLD AUTO: 5.3 %
N GONORRHOEA DNA SPEC QL NAA+PROBE: NOT DETECTED
NEUTROPHILS # BLD AUTO: 5.41 X10(3)/MCL (ref 2.1–9.2)
NEUTROPHILS NFR BLD AUTO: 74.2 %
NITRITE UR QL STRIP.AUTO: NEGATIVE
NRBC BLD AUTO-RTO: 0 %
OPIATES UR QL SCN: NEGATIVE
PCP UR QL: NEGATIVE
PH UR STRIP.AUTO: 7 [PH]
PH UR: 7 [PH] (ref 3–11)
PLATELET # BLD AUTO: 163 X10(3)/MCL (ref 130–400)
PMV BLD AUTO: 12.5 FL (ref 7.4–10.4)
PROT UR QL STRIP.AUTO: NEGATIVE
RBC # BLD AUTO: 3.38 X10(6)/MCL (ref 4.2–5.4)
RBC #/AREA URNS AUTO: 39 /HPF
RBC UR QL AUTO: ABNORMAL
SOURCE (OHS): NORMAL
SP GR UR STRIP.AUTO: 1.02 (ref 1–1.03)
SPECIMEN OUTDATE: NORMAL
SQUAMOUS #/AREA URNS AUTO: <5 /HPF
UROBILINOGEN UR STRIP-ACNC: 1
WBC # SPEC AUTO: 7.29 X10(3)/MCL (ref 4.5–11.5)
WBC #/AREA URNS AUTO: <5 /HPF

## 2023-08-15 PROCEDURE — 96360 HYDRATION IV INFUSION INIT: CPT

## 2023-08-15 PROCEDURE — 86900 BLOOD TYPING SEROLOGIC ABO: CPT | Performed by: OBSTETRICS & GYNECOLOGY

## 2023-08-15 PROCEDURE — 81001 URINALYSIS AUTO W/SCOPE: CPT | Performed by: OBSTETRICS & GYNECOLOGY

## 2023-08-15 PROCEDURE — 96361 HYDRATE IV INFUSION ADD-ON: CPT

## 2023-08-15 PROCEDURE — 63600175 PHARM REV CODE 636 W HCPCS: Performed by: OBSTETRICS & GYNECOLOGY

## 2023-08-15 PROCEDURE — 80307 DRUG TEST PRSMV CHEM ANLYZR: CPT | Performed by: OBSTETRICS & GYNECOLOGY

## 2023-08-15 PROCEDURE — 87591 N.GONORRHOEAE DNA AMP PROB: CPT | Performed by: OBSTETRICS & GYNECOLOGY

## 2023-08-15 PROCEDURE — 85025 COMPLETE CBC W/AUTO DIFF WBC: CPT | Performed by: OBSTETRICS & GYNECOLOGY

## 2023-08-15 PROCEDURE — 99284 EMERGENCY DEPT VISIT MOD MDM: CPT

## 2023-08-15 RX ADMIN — SODIUM CHLORIDE, POTASSIUM CHLORIDE, SODIUM LACTATE AND CALCIUM CHLORIDE 1000 ML: 600; 310; 30; 20 INJECTION, SOLUTION INTRAVENOUS at 12:08

## 2023-08-15 NOTE — ED TRIAGE NOTES
Spoke with lab inquiring about how much longer for ua results. Was informed they are resulting right now

## 2023-08-15 NOTE — ED PROVIDER NOTES
JESUS NOTE  [facility]     Admit Date: 8/15/2023  JESUS Physician: Karol Chacko  Primary OBGYN:     Admit Diagnosis/Chief Complaint: Vaginal Bleeding  Discharge Diagnosis:  dehydration, postcoital bleeding    Chief Complaint   Patient presents with    Vaginal Bleeding     Iup at 29.6 with c/o dark red vaginal bleeding for about an hour. [place] last night. Denies any pain       Hospital Course:  Shakir Llanos is a 22 y.o.  at 29w6d presents complaining of VB this am after intercourse last night. Says was a large gush of blood, more than a period.   This IUP is complicated by ASCUS HPV+ pap, depression, hx first tri bleeding.   Posterior placenta noted 22 wks scan.  Patient denies leakage of fluid, contractions, headache, vision changes, RUQ pain, dysuria, fever, and nausea/vomiting.  Fetal Movement: normal.    BP (!) 104/59   Pulse 89   Temp 98.4 °F (36.9 °C) (Oral)   Resp 18   LMP 2023 (Exact Date) Comment: Reports irregular period. Requesting UPT  Breastfeeding No   Temp:  [98.4 °F (36.9 °C)] 98.4 °F (36.9 °C)  Pulse:  [89] 89  Resp:  [18] 18  BP: (104)/(59) 104/59    General: in no apparent distress alert oriented times 3 afebrile  Cardiovascular: regular rate and rhythm no murmurs  Respiratory: unlabored  Abdominal: soft, nontender, nondistended, no abnormal masses, no epigastric pain FHT present  Back: lumbar tenderness absent CVA tenderness none suprapubic tenderness absent  Extremeties no redness or tenderness in the calves or thighs no edema    SVE (PeriWATCH)  Dilation (cm): 0  Cervical Position: Mid Position  Cervical Consistency: Firm  Examined by:: Ginna Zhang in room: [facility]  Vaginal Exam Comments: old blood <5cc vault, no active bleeding, cervix thick and visually closed, no lesions, normal friability for pregnancy, no yeast dc   Chaperoned SSE done by me      EFM: Cat 1, 145 modBTV, +accel, no decel (reassuring,  reactive)  TOCO:  none      Medical Decision Making:      LABS:   No results found for this or any previous visit (from the past 24 hour(s)).    Imaging Results    None          ASSESMENT and clinical impression: Shakir Llanos is a 22 y.o.   at 29w6d with resolved VB, friable cervix to to HPV changes, no active bleeding, reassuring fetal status  - CBC, IV fluids  - UA, GCCT urine, UDS    Discharge Diagnosis/clinical impression:   Patient Active Problem List   Diagnosis    Depression affecting pregnancy    ASCUS with positive high risk HPV cervical       Status:Stable    Disposition:  discharged to home    Medications:   none    Patient Instructions:   - Pt was given routine pregnancy instructions including to return to triage if she had any vaginal bleeding (other than spotting for the next 48hrs), any loss of fluid like her water broke, decreased fetal movement, or contractions Q 5min lasting for 2 or more hours. Pt was also instructed to drink copious water. Patient voiced understanding of all these instructions and was subsequently discharged home. Tylenol use and maternity belt use discussed. All questions answered. Pt left JESUS with good understanding of plan.   Preeclampsia/ROM/labor/fever/decreased FM with [facility] precautions discussed, voiced understanding     She will follow up with her primary OB as scheduled    Karol Chacko MD  OB/GYN Hospitalist  11:43 AM 08/15/2023

## 2023-08-24 ENCOUNTER — OFFICE VISIT (OUTPATIENT)
Dept: FAMILY MEDICINE | Facility: CLINIC | Age: 23
End: 2023-08-24
Payer: MEDICAID

## 2023-08-24 VITALS
HEART RATE: 75 BPM | SYSTOLIC BLOOD PRESSURE: 115 MMHG | WEIGHT: 134 LBS | TEMPERATURE: 98 F | BODY MASS INDEX: 24.66 KG/M2 | OXYGEN SATURATION: 99 % | HEIGHT: 62 IN | RESPIRATION RATE: 17 BRPM | DIASTOLIC BLOOD PRESSURE: 77 MMHG

## 2023-08-24 DIAGNOSIS — D64.9 ANEMIA, UNSPECIFIED TYPE: ICD-10-CM

## 2023-08-24 DIAGNOSIS — F32.A DEPRESSION AFFECTING PREGNANCY: ICD-10-CM

## 2023-08-24 DIAGNOSIS — R87.810 ASCUS WITH POSITIVE HIGH RISK HPV CERVICAL: ICD-10-CM

## 2023-08-24 DIAGNOSIS — Z3A.31 31 WEEKS GESTATION OF PREGNANCY: Primary | ICD-10-CM

## 2023-08-24 DIAGNOSIS — O99.340 DEPRESSION AFFECTING PREGNANCY: ICD-10-CM

## 2023-08-24 DIAGNOSIS — R87.610 ASCUS WITH POSITIVE HIGH RISK HPV CERVICAL: ICD-10-CM

## 2023-08-24 PROCEDURE — 81002 URINALYSIS NONAUTO W/O SCOPE: CPT | Mod: PBBFAC | Performed by: STUDENT IN AN ORGANIZED HEALTH CARE EDUCATION/TRAINING PROGRAM

## 2023-08-24 PROCEDURE — 99213 OFFICE O/P EST LOW 20 MIN: CPT | Mod: PBBFAC | Performed by: STUDENT IN AN ORGANIZED HEALTH CARE EDUCATION/TRAINING PROGRAM

## 2023-08-24 RX ORDER — FERROUS SULFATE 325(65) MG
325 TABLET ORAL EVERY OTHER DAY
Qty: 90 TABLET | Refills: 1 | Status: ON HOLD | OUTPATIENT
Start: 2023-08-24 | End: 2023-10-27 | Stop reason: HOSPADM

## 2023-08-24 NOTE — PROGRESS NOTES
[facility]    Subjective     Patient ID: Shakir Llanos is a 22 y.o. female.    Chief Complaint: Routine Prenatal Visit (31 weeks and 1 day)    Shakir Llanos is a 22 y.o. female   28w5d  with BENITO 10/25/2023 by LMP; consistent with US presenting to [facility] for routine OB visit.    Current Issues: No complaints. Taking PNV daily. Interested in colostrum stimulation.     Chronic Issues:   Depression diagnosed , took Lexapro until . Self-discontinued medications. Seeing therapist (Ms Fifi Destiney) every 2 weeks .  No SI/HI. Has good social support      Gestational History:   .       OB Review of Systems:  Fetal Movements: yes  Vaginal bleeding: None  Leakage of Fluid: None   Vaginal Discharge: None  Headaches: None  Lower Extremity Edema: None  Vision Changes: None  Contractions: None    Constitutional: no fever, no chills, no weight loss  CV: no swelling, no edema, no chest pain  : denies dysuria  GI:  no constipation, no diarrhea no vomiting  RESP: no SOB, no wheezing, no difficulty breathing  Psych: no depression, no anxiety          Objective   Vitals:    23 0900   BP: 115/77   Pulse: 75   Resp: 17   Temp: 98.2 °F (36.8 °C)       Physical Exam  General: in no acute distress  RESP: clear to auscultation bilaterally, non labored  CV: regular rate and rhythm, no murmurs, no edema  ABD: gravid, nontender, BS+  FHTs:  145 bpm by doppler  Fundal height: 31 cm           Initial OB Labs ordered 23  - Blood Type and Rh: O pos  - Antibody Screen: neg  - CBC H/H: 11.7/35.2  - HIV: neg  - RPR: neg  - GC: neg  - CT: neg  - HBsAg: neg  - HCVAb: neg  - Rubella: immune  - Varicella: immune  - UA & Culture: NG  - Sickle Cell Screen: neg  - PAP: ASCUS, HRHPV positive. HPV 16, 18/45 negative.   - Influenza vaccine date: states did not get shot last season  - BTL desired: no    15-20 Weeks Lab ordered today (23)  - Quad Screen: Normal risk    28 Week Lab ordered  23  - 1H GTT: 92  - Rhogam: not indicated  - Date of Tdap: 23  - CBC H/H: 10.9/33.7  - RPR: NR  - BTL consent: declined- Nexplanon    37 Week Lab  - CBC H/H:   - RPR:   - GBS Culture:   - HIV:   - Cervical GC:         Imaging:   Initial US: 23: Single intrauterine pregnancy with a viable fetus with a fetal heart rate of 161 beats per minute.Estimated date of delivery 2023  Estimated age by ultrasound 13 weeks and 4 days +/-1 week 2 days.     FAS 23- Impression: jacobsen living IUP is identified.   Fetal size is appropriate for established dating. No fetal structural malformations are identified. Cervical length by TA scanning is normal. Placental location is posterior without evidence of previa.      US in ED  vaginal bleedin23   - Single live intrauterine pregnancy with heart rate of 145 bpm and AUA of 22 weeks.           Lab Results   Component Value Date    COLORU Yellow 2023    SPECGRAV 1.020 2023    PHUR 7.0 2023    WBCUR small 2023    NITRITE neg 2023    PROTEINPOC neg 2023    GLUCOSEUR neg 2023    KETONESU neg 2023    UROBILINOGEN 0.2 2023    BILIRUBINPOC neg 2023    RBCUR neg 2023            Assessment and Plan     1. 31 weeks gestation of pregnancy  -     POCT URINE DIPSTICK WITHOUT MICROSCOPE    2. Depression affecting pregnancy    3. ASCUS with positive high risk HPV cervical    Other orders  -     ferrous sulfate (FEOSOL) 325 mg (65 mg iron) Tab tablet; Take 1 tablet (325 mg total) by mouth every other day.  Dispense: 90 tablet; Refill: 1        31 weeks gestation of pregnancy  -OB Protocol  -PNVs  -Urine dip reviewed.   - Mother plans on breast feeding. Instructed to hold on milk stimulation until 37-38 weeks. Manual or pump stimulation.  - Contraception: Considering Nexplanon  -Labor and ED precautions discussed in depth. Strict ED Precautions: Fever, vaginal bleeding or leaking fluid, belly  cramping or pain, regular contractions (q5-7min), shortness of breath-chest pain, swelling of the face-hands, ankles and feet or legs. Decreased fetal movement (don't feel the baby move in over an hour). Changes in vision. Severe headache that does not resolve with rest or Tylenol     Depression  - Continue therapy  - Monitor mood closely.      ASCUS w/ HPV +  - repeat pap on PP visit  - Awaiting appt from [facility] in regards to ASCUS     Anemia  - mild   - iron supplementation every other day       RTC in 2 weeks             Justine Craig M.D.  [facility]         Follow up in about 2 weeks (around 9/7/2023).

## 2023-08-28 ENCOUNTER — PATIENT OUTREACH (OUTPATIENT)
Dept: FAMILY MEDICINE | Facility: CLINIC | Age: 23
End: 2023-08-28
Payer: MEDICAID

## 2023-09-06 ENCOUNTER — OFFICE VISIT (OUTPATIENT)
Dept: FAMILY MEDICINE | Facility: CLINIC | Age: 23
End: 2023-09-06
Payer: MEDICAID

## 2023-09-06 VITALS
RESPIRATION RATE: 18 BRPM | TEMPERATURE: 98 F | HEIGHT: 62 IN | WEIGHT: 138.81 LBS | HEART RATE: 74 BPM | SYSTOLIC BLOOD PRESSURE: 104 MMHG | BODY MASS INDEX: 25.54 KG/M2 | DIASTOLIC BLOOD PRESSURE: 73 MMHG | OXYGEN SATURATION: 100 %

## 2023-09-06 DIAGNOSIS — O99.340 DEPRESSION AFFECTING PREGNANCY: ICD-10-CM

## 2023-09-06 DIAGNOSIS — R87.810 ASCUS WITH POSITIVE HIGH RISK HPV CERVICAL: ICD-10-CM

## 2023-09-06 DIAGNOSIS — R87.610 ASCUS WITH POSITIVE HIGH RISK HPV CERVICAL: ICD-10-CM

## 2023-09-06 DIAGNOSIS — Z34.90 PREGNANCY, UNSPECIFIED GESTATIONAL AGE: Primary | ICD-10-CM

## 2023-09-06 DIAGNOSIS — F32.A DEPRESSION AFFECTING PREGNANCY: ICD-10-CM

## 2023-09-06 LAB
BILIRUB SERPL-MCNC: NORMAL MG/DL
BLOOD URINE, POC: NORMAL
CLARITY, POC UA: CLEAR
COLOR, POC UA: YELLOW
GLUCOSE UR QL STRIP: NORMAL
KETONES UR QL STRIP: NORMAL
LEUKOCYTE ESTERASE URINE, POC: NORMAL
NITRITE, POC UA: NORMAL
PH, POC UA: 7
PROTEIN, POC: NORMAL
SPECIFIC GRAVITY, POC UA: 1.01
UROBILINOGEN, POC UA: 0.2

## 2023-09-06 PROCEDURE — 81002 URINALYSIS NONAUTO W/O SCOPE: CPT | Mod: PBBFAC

## 2023-09-06 PROCEDURE — 99214 OFFICE O/P EST MOD 30 MIN: CPT | Mod: PBBFAC

## 2023-09-06 NOTE — PROGRESS NOTES
"  [facility] OB OFFICE VISIT NOTE     Primary PCP: none    Chief Complaint     Shakir Llanos is a 22 y.o. female   32w6d 10/25/2023, by Last Menstrual Period presenting to [facility] for routine OB visit.    Current Issues:   Pt denied any acute concerns today. Feels baby kick and move often. Denied any contractions, loss of fluids or vaginal bleeding. States her depression is well controlled. Just had baby shower last week; is excited for baby to come.     Chronic Issues:   Depression diagnosed , took Lexapro until . Self-discontinued medications. Seeing therapist (Ms Calix Destiney) every 2 weeks .  No SI/HI. Has good social support     Obstetrics History     OB History          1    Para   0    Term   0       0    AB   0    Living   0         SAB   0    IAB   0    Ectopic   0    Multiple   0    Live Births   0                Review of Systems     Antepartum specific   - Fetal movements: yes  - Vaginal bleeding: no  - Vaginal discharge: no  - Loss of fluid: no  - Contractions: no  - Headaches: no  - Vision changes: no  - Edema: no    Vital Signs     Vitals:    23 0914   BP: 104/73   BP Location: Right arm   Patient Position: Sitting   BP Method: Medium (Automatic)   Pulse: 74   Resp: 18   Temp: 98.4 °F (36.9 °C)   TempSrc: Oral   SpO2: 100%   Weight: 63 kg (138 lb 12.8 oz)   Height: 5' 2" (1.575 m)        Physical Exam   General: in no acute distress  RESP: clear to auscultation bilaterally, non labored  CV: regular rate and rhythm, no murmurs, no edema  ABD: gravid, nontender, BS+  FHTs: 130s - 140s bpm via Doppler  Fundal height: 31 cm    Current Medications:      Current Outpatient Medications   Medication Sig Dispense Refill    ferrous sulfate (FEOSOL) 325 mg (65 mg iron) Tab tablet Take 1 tablet (325 mg total) by mouth every other day. 90 tablet 1    lansoprazole (PREVACID) 15 MG capsule Take 1 capsule (15 mg total) by mouth once daily. 30 capsule 3    M- PLUS 27 " mg iron- 1 mg Tab Take 1 tablet by mouth.      SE--19 29 mg iron- 1 mg Tab Take 1 tablet by mouth.       No current facility-administered medications for this visit.     Labs   Initial OB Labs: 23  - Blood Type and Rh: O+  - Antibody Screen: neg  - CBC H/H: 11.7/35.2  - HIV: neg  - RPR: neg  - GC: neg  - CT: neg  - HBsAg: neg  - HCVAb: neg  - Rubella: immune  - Varicella: immune  - UA & Culture: ng  - Sickle Cell Screen: neg   - PAP: ASCUS, HRHPV positive. HPV 16, 18/45 negative.  - Influenza vaccine date: UTD  - BTL desired: no     15-20 Weeks Lab 23  - Quad Screen: normal risk    28 Week Lab 23  - 1H GTT: 92  - Rhogam: not indicated  - Date of Tdap: 23  - CBC H/H: 10.9/33.7  - RPR: NR  - BTL consent: declined Nexplanon     37 Week Lab  - CBC H/H:   - RPR:   - GBS Culture:   - HIV:   - Cervical GC:   - Cervical Exam:     Imaging    Initial US:23: Single intrauterine pregnancy with a viable fetus with a fetal heart rate of 161 beats per minute.Estimated date of delivery 2023  Estimated age by ultrasound 13 weeks and 4 days +/-1 week 2 days.    Anatomy Scan: 23  Impression: jacobsen living IUP is identified.   Fetal size is appropriate for established dating. No fetal structural malformations are identified. Cervical length by TA scanning is normal. Placental location is posterior without evidence of previa.     US in ED 2/ vaginal bleedin23   - Single live intrauterine pregnancy with heart rate of 145 bpm and AUA of 22 weeks.    Assessment     1. Pregnancy, unspecified gestational age    2. Depression affecting pregnancy    3. ASCUS with positive high risk HPV cervical      Plan   - OB Protocol   - PNVs  - Urine dip reviewed; denied any symptoms of dysuria, increased frequency.   - Routine labs: reviewed above  - Mother plans to breast and/or bottlefeed  - Postpartum contraception discussion: Nexplanon  -Depression well controlled without medication at this time.  Denied SI/HI. Attends therapy.   - Labor precautions discussed in depth  -Will need repeat PAP at postpartum visit  - Return to clinic in 3 weeks for routine 36 week labs    Ajay Nolen MD  [facility]

## 2023-09-07 ENCOUNTER — PATIENT MESSAGE (OUTPATIENT)
Dept: FAMILY MEDICINE | Facility: CLINIC | Age: 23
End: 2023-09-07
Payer: MEDICAID

## 2023-09-11 ENCOUNTER — PATIENT OUTREACH (OUTPATIENT)
Dept: FAMILY MEDICINE | Facility: CLINIC | Age: 23
End: 2023-09-11
Payer: MEDICAID

## 2023-09-20 ENCOUNTER — PATIENT MESSAGE (OUTPATIENT)
Dept: FAMILY MEDICINE | Facility: CLINIC | Age: 23
End: 2023-09-20
Payer: MEDICAID

## 2023-09-21 ENCOUNTER — PATIENT OUTREACH (OUTPATIENT)
Dept: FAMILY MEDICINE | Facility: CLINIC | Age: 23
End: 2023-09-21
Payer: MEDICAID

## 2023-10-03 ENCOUNTER — OFFICE VISIT (OUTPATIENT)
Dept: FAMILY MEDICINE | Facility: CLINIC | Age: 23
End: 2023-10-03
Payer: MEDICAID

## 2023-10-03 VITALS
TEMPERATURE: 98 F | SYSTOLIC BLOOD PRESSURE: 112 MMHG | OXYGEN SATURATION: 98 % | WEIGHT: 138.63 LBS | RESPIRATION RATE: 18 BRPM | BODY MASS INDEX: 25.51 KG/M2 | DIASTOLIC BLOOD PRESSURE: 71 MMHG | HEART RATE: 83 BPM | HEIGHT: 62 IN

## 2023-10-03 DIAGNOSIS — G47.01 INSOMNIA DUE TO MEDICAL CONDITION: ICD-10-CM

## 2023-10-03 DIAGNOSIS — O26.893 VAGINAL DISCHARGE DURING PREGNANCY IN THIRD TRIMESTER: ICD-10-CM

## 2023-10-03 DIAGNOSIS — Z3A.37 37 WEEKS GESTATION OF PREGNANCY: Primary | ICD-10-CM

## 2023-10-03 DIAGNOSIS — N89.8 VAGINAL DISCHARGE DURING PREGNANCY IN THIRD TRIMESTER: ICD-10-CM

## 2023-10-03 LAB
BILIRUB SERPL-MCNC: NORMAL MG/DL
BLOOD URINE, POC: NORMAL
C TRACH DNA SPEC QL NAA+PROBE: NOT DETECTED
CLARITY, POC UA: CLEAR
COLOR, POC UA: YELLOW
GLUCOSE UR QL STRIP: NORMAL
KETONES UR QL STRIP: NORMAL
LEUKOCYTE ESTERASE URINE, POC: NORMAL
N GONORRHOEA DNA SPEC QL NAA+PROBE: NOT DETECTED
NITRITE, POC UA: NORMAL
PH, POC UA: 6.5
PROTEIN, POC: NORMAL
SOURCE (OHS): NORMAL
SPECIFIC GRAVITY, POC UA: 1.03
UROBILINOGEN, POC UA: 1

## 2023-10-03 PROCEDURE — 87081 CULTURE SCREEN ONLY: CPT

## 2023-10-03 PROCEDURE — 81002 URINALYSIS NONAUTO W/O SCOPE: CPT | Mod: PBBFAC

## 2023-10-03 PROCEDURE — 87491 CHLMYD TRACH DNA AMP PROBE: CPT

## 2023-10-03 PROCEDURE — 99214 OFFICE O/P EST MOD 30 MIN: CPT | Mod: PBBFAC

## 2023-10-03 PROCEDURE — 87591 N.GONORRHOEAE DNA AMP PROB: CPT

## 2023-10-03 RX ORDER — VITAMIN A, VITAMIN C, VITAMIN D, VITAMIN E, THIAMINE, RIBOFLAVIN, NIACIN, VITAMIN B6, FOLIC ACID, VITAMIN B12, CALCIUM, IRON, ZINC, COPPER 4000; 120; 400; 22; 1.84; 3; 20; 10; 1; 12; 200; 27; 25; 2 [IU]/1; MG/1; [IU]/1; [IU]/1; MG/1; MG/1; MG/1; MG/1; MG/1; UG/1; MG/1; MG/1; MG/1; MG/1
1 TABLET ORAL DAILY
Qty: 30 TABLET | Refills: 3 | Status: SHIPPED | OUTPATIENT
Start: 2023-10-03

## 2023-10-03 NOTE — PROGRESS NOTES
"  [facility] OB OFFICE VISIT NOTE     Primary PCP: none  OB: Justine Craig MD    Chief Complaint     Shakir Llanos is a 22 y.o. female   36w5d 10/25/2023, by Last Menstrual Period presenting to [facility] for routine OB visit.    Current Issues:   Patient is having trouble initiating sleep saying she sleeps for maybe 3 hours a night for 1 week. She is uncomfortable with the baby and cannot find a comfortable position to sleep in.    Headaches for 3 times daily- central and self resolves without medication    Feels baby kick and move often. Denied any contractions, loss of fluids or vaginal bleeding. States her depression is well controlled. Just had baby shower last week; is excited for baby to come. Currently taking PNV, ferrous sulfate, and prevacid PRN    Chronic Issues:   Depression diagnosed , took Lexapro until . Self-discontinued medications. Seeing therapist (Ms Fifi Cabello) every 2 weeks .  No SI/HI. Has good social support     Obstetrics History     OB History          1    Para   0    Term   0       0    AB   0    Living   0         SAB   0    IAB   0    Ectopic   0    Multiple   0    Live Births   0                Review of Systems     Antepartum specific   - Fetal movements: yes  - Vaginal bleeding: no  - Vaginal discharge: yes; white creamy thin discharge  - Loss of fluid: no  - Contractions: no  - Headaches: yes- self resolved without tylenol or medication  - Vision changes: no  - Edema: no    Vital Signs     Vitals:    10/03/23 0843   BP: 112/71   BP Location: Right arm   Patient Position: Sitting   BP Method: Medium (Automatic)   Pulse: 83   Resp: 18   Temp: 98.4 °F (36.9 °C)   TempSrc: Oral   SpO2: 98%   Weight: 62.9 kg (138 lb 9.6 oz)   Height: 5' 2" (1.575 m)          Physical Exam   General: in no acute distress  RESP: clear to auscultation bilaterally, non labored  CV: regular rate and rhythm, no murmurs, no edema  ABD: gravid, nontender, BS+  FHTs: 140s " bpm via Doppler  Fundal height: 34.5 cm  Vaginal exam: cervix is soft with white discharge present. Vulva without lesions present; no skin discoloration or rash. White chunky discharge on vulva and within vagina. No adnexal tenderness, gravid uterus firm.    Current Medications:      Current Outpatient Medications   Medication Sig Dispense Refill    ferrous sulfate (FEOSOL) 325 mg (65 mg iron) Tab tablet Take 1 tablet (325 mg total) by mouth every other day. 90 tablet 1    lansoprazole (PREVACID) 15 MG capsule Take 1 capsule (15 mg total) by mouth once daily. 30 capsule 3    M-CONSTANCE PLUS 27 mg iron- 1 mg Tab Take 1 tablet (1 each total) by mouth once daily. 30 tablet 3     No current facility-administered medications for this visit.     Labs   Initial OB Labs: 23  - Blood Type and Rh: O+  - Antibody Screen: neg  - CBC H/H: 11.7/35.2  - HIV: neg  - RPR: neg  - GC: neg  - CT: neg  - HBsAg: neg  - HCVAb: neg  - Rubella: immune  - Varicella: immune  - UA & Culture: ng  - Sickle Cell Screen: neg   - PAP: ASCUS, HRHPV positive. HPV 16, 18/45 negative.  - Influenza vaccine date: UTD  - BTL desired: no     15-20 Weeks Lab 23  - Quad Screen: normal risk    28 Week Lab 23  - 1H GTT: 92  - Rhogam: not indicated  - Date of Tdap: 23  - CBC H/H: 10.9/33.7  - RPR: NR  - BTL consent: declined Nexplanon     37 Week Lab 10/3/23-patient left without getting labs drawn-will call patient to return to clinic for labs only  - CBC H/H:   - RPR:   - GBS Culture: Moderate GBS   - HIV:   - Cervical GC:   - Cervical Exam:       Urine Dipstick: (10/3/23)  Color, UA Yellow    pH, UA 6.5    WBC, UA small    Nitrite, UA neg    Protein, POC 30 mg    Glucose, UA neg    Ketones, UA neg    Urobilinogen, UA 1.0    Bilirubin, POC neg    Blood, UA neg    Clarity, UA Clear    Spec Grav UA 1.030              Imaging    Initial US:23: Single intrauterine pregnancy with a viable fetus with a fetal heart rate of 161 beats per  minute.Estimated date of delivery 2023  Estimated age by ultrasound 13 weeks and 4 days +/-1 week 2 days.    Anatomy Scan: 23  Impression: jacobsen living IUP is identified.   Fetal size is appropriate for established dating. No fetal structural malformations are identified. Cervical length by TA scanning is normal. Placental location is posterior without evidence of previa. Breeched presentation.    US in ED 2/2 vaginal bleedin23   - Single live intrauterine pregnancy with heart rate of 145 bpm and AUA of 22 weeks.Vertex Presentation.    Assessment     1. 37 weeks gestation of pregnancy    2. Vaginal discharge during pregnancy in third trimester    3. Insomnia due to medical condition      Plan   - OB Protocol   - PNVs  - Urine dip reviewed; denied any symptoms of dysuria, increased frequency.   - Routine labs: reviewed above  - Mother plans to breast and/or bottlefeed  - Postpartum contraception discussion: Nexplanon  -Depression well controlled without medication at this time. Denied SI/HI. Attends therapy.   -Labor precautions discussed in depth  -Will need repeat PAP at postpartum visit  -Will not treat discharge at this time; spoke with Dr. Pena and not needed at this time  - Instructed patient to take melatonin 3-5 mg nightly as needed for insomnia   -Return to clinic in 1 week for 38 week follow up        Ángela Restrepo DO  [facility] -II

## 2023-10-04 LAB — BACTERIA SPEC CULT: ABNORMAL

## 2023-10-16 ENCOUNTER — OFFICE VISIT (OUTPATIENT)
Dept: FAMILY MEDICINE | Facility: CLINIC | Age: 23
End: 2023-10-16
Payer: MEDICAID

## 2023-10-16 VITALS
RESPIRATION RATE: 19 BRPM | WEIGHT: 139 LBS | DIASTOLIC BLOOD PRESSURE: 79 MMHG | TEMPERATURE: 98 F | SYSTOLIC BLOOD PRESSURE: 113 MMHG | BODY MASS INDEX: 25.58 KG/M2 | HEART RATE: 124 BPM | OXYGEN SATURATION: 99 % | HEIGHT: 62 IN

## 2023-10-16 DIAGNOSIS — Z3A.38 38 WEEKS GESTATION OF PREGNANCY: Primary | ICD-10-CM

## 2023-10-16 LAB
BASOPHILS # BLD AUTO: 0.03 X10(3)/MCL
BASOPHILS NFR BLD AUTO: 0.3 %
BILIRUB SERPL-MCNC: NEGATIVE MG/DL
BLOOD URINE, POC: NEGATIVE
CLARITY, POC UA: CLEAR
COLOR, POC UA: NORMAL
EOSINOPHIL # BLD AUTO: 0.07 X10(3)/MCL (ref 0–0.9)
EOSINOPHIL NFR BLD AUTO: 0.8 %
ERYTHROCYTE [DISTWIDTH] IN BLOOD BY AUTOMATED COUNT: 13 % (ref 11.5–17)
GLUCOSE UR QL STRIP: NEGATIVE
HCT VFR BLD AUTO: 36.7 % (ref 37–47)
HGB BLD-MCNC: 12.4 G/DL (ref 12–16)
HIV 1+2 AB+HIV1 P24 AG SERPL QL IA: NONREACTIVE
IMM GRANULOCYTES # BLD AUTO: 0.08 X10(3)/MCL (ref 0–0.04)
IMM GRANULOCYTES NFR BLD AUTO: 0.9 %
KETONES UR QL STRIP: NORMAL
LEUKOCYTE ESTERASE URINE, POC: NORMAL
LYMPHOCYTES # BLD AUTO: 1.88 X10(3)/MCL (ref 0.6–4.6)
LYMPHOCYTES NFR BLD AUTO: 21.2 %
MCH RBC QN AUTO: 30.1 PG (ref 27–31)
MCHC RBC AUTO-ENTMCNC: 33.8 G/DL (ref 33–36)
MCV RBC AUTO: 89.1 FL (ref 80–94)
MONOCYTES # BLD AUTO: 0.61 X10(3)/MCL (ref 0.1–1.3)
MONOCYTES NFR BLD AUTO: 6.9 %
NEUTROPHILS # BLD AUTO: 6.18 X10(3)/MCL (ref 2.1–9.2)
NEUTROPHILS NFR BLD AUTO: 69.9 %
NITRITE, POC UA: NEGATIVE
NRBC BLD AUTO-RTO: 0 %
PH, POC UA: 6.5
PLATELET # BLD AUTO: 158 X10(3)/MCL (ref 130–400)
PLATELETS.RETICULATED NFR BLD AUTO: 15.5 % (ref 0.9–11.2)
PMV BLD AUTO: 13.3 FL (ref 7.4–10.4)
PROTEIN, POC: NORMAL
RBC # BLD AUTO: 4.12 X10(6)/MCL (ref 4.2–5.4)
SPECIFIC GRAVITY, POC UA: 1.02
T PALLIDUM AB SER QL: NONREACTIVE
UROBILINOGEN, POC UA: NORMAL
WBC # SPEC AUTO: 8.85 X10(3)/MCL (ref 4.5–11.5)

## 2023-10-16 PROCEDURE — 81002 URINALYSIS NONAUTO W/O SCOPE: CPT | Mod: PBBFAC

## 2023-10-16 PROCEDURE — 36415 COLL VENOUS BLD VENIPUNCTURE: CPT

## 2023-10-16 PROCEDURE — 85025 COMPLETE CBC W/AUTO DIFF WBC: CPT

## 2023-10-16 PROCEDURE — 87389 HIV-1 AG W/HIV-1&-2 AB AG IA: CPT

## 2023-10-16 PROCEDURE — 99213 OFFICE O/P EST LOW 20 MIN: CPT | Mod: PBBFAC

## 2023-10-16 PROCEDURE — 90686 IIV4 VACC NO PRSV 0.5 ML IM: CPT | Mod: PBBFAC

## 2023-10-16 PROCEDURE — 86780 TREPONEMA PALLIDUM: CPT

## 2023-10-16 PROCEDURE — 90471 IMMUNIZATION ADMIN: CPT | Mod: PBBFAC

## 2023-10-16 RX ADMIN — INFLUENZA VIRUS VACCINE 0.5 ML: 15; 15; 15; 15 SUSPENSION INTRAMUSCULAR at 11:10

## 2023-10-16 NOTE — PROGRESS NOTES
"[facility] OB OFFICE VISIT NOTE  Shakir Llanos  [id number]  10/16/2023    Primary PCP: none  OB: Justine Craig MD    Chief Complaint: Routine Prenatal Visit (38w5d) and Abdominal Pain (When walking)    Shakir Llanos is a 22 y.o. female   38w5d BENITO 10/25/2023, by Last Menstrual Period presenting to [facility] for 38 wk wellness visit.    Current Issues:  Reports some increased pelvic pain since last week. Reporting some increased SOB with exertion for approx 2 weeks. Improves with rest. No CP.     Chronic Issues: Depression- seeing therapist weekly, decscribes mood as stable. No SI/HI    Gestational History: N/A  (date, GA, length labor, BW, sex, type, anesthesia, place, complications)      Antepartum specific ROS  - Fetal movements: yes  - Vaginal bleeding: no  - Vaginal discharge: white physiologic discharge stable from previous visit  - Loss of fluid: no  - Contractions: no  - Headaches: no  - Vision changes: no  - Edema: no    Blood pressure 113/79, pulse (!) 124, temperature 98.2 °F (36.8 °C), temperature source Oral, resp. rate 19, height 5' 2" (1.575 m), weight 63 kg (139 lb), last menstrual period 2023, SpO2 99 %.   Physical Exam  General: in no acute distress  RESP: clear to auscultation bilaterally, non labored  CV: regular rate and rhythm, no murmurs, no edema, Repeat pulse noted to be 92  ABD: gravid, nontender, BS+  Cervix: Fingertip dilation noted  FHT: 133 bpm by doppler  Fundal Height: 37 cm    Current Medications:   Current Outpatient Medications   Medication Sig Dispense Refill    ferrous sulfate (FEOSOL) 325 mg (65 mg iron) Tab tablet Take 1 tablet (325 mg total) by mouth every other day. 90 tablet 1    lansoprazole (PREVACID) 15 MG capsule Take 1 capsule (15 mg total) by mouth once daily. 30 capsule 3    M-CONSTANCE PLUS 27 mg iron- 1 mg Tab Take 1 tablet (1 each total) by mouth once daily. 30 tablet 3     Current Facility-Administered Medications   Medication Dose Route Frequency " Provider Last Rate Last Admin    influenza (QUADRIVALENT PF) vaccine 0.5 mL  0.5 mL Intramuscular 1 time in Clinic/HOD Antwan Glasgow MD           Labs:  Urine dipstick:   Color, UA Dark Yellow    pH, UA 6.5    WBC, UA Small    Nitrite, UA Negative    Protein, POC Trace    Glucose, UA Negative    Ketones, UA 15 mg/dL    Urobilinogen, UA 0.2 E.U./dL    Bilirubin, POC Negative    Blood, UA Negative    Clarity, UA Clear    Spec Grav UA 1.025        Initial OB Labs: 23  - Blood Type and Rh: O+  - Antibody Screen: neg  - CBC H/H: 11.7/35.2  - HIV: neg  - RPR: neg  - GC: neg  - CT: neg  - HBsAg: neg  - HCVAb: neg  - Rubella: immune  - Varicella: immune  - UA & Culture: ng  - Sickle Cell Screen: neg   - PAP: ASCUS, HRHPV positive. HPV 16, 18/45 negative.  - Influenza vaccine date: 10/16/2023  - BTL desired: no     15-20 Weeks Lab 23  - Quad Screen: normal risk    28 Week Lab 23  - 1H GTT: 92  - Rhogam: not indicated  - Date of Tdap: 23  - CBC H/H: 10.9/33.7  - RPR: NR  - BTL consent: declined Nexplanon     37 Week Lab Swabs 10/3; Labs drawn 10/16  - CBC H/H:   - RPR:   - GBS Culture: Moderate GBS   - HIV:   - Cervical GC: Neg  - Cervical Exam: Fingertip dilation noted    Imaging:   Initial US 23: Single intrauterine pregnancy with a viable fetus with a fetal heart rate of 161 beats per minute.Estimated date of delivery 2023  Estimated age by ultrasound 13 weeks and 4 days +/-1 week 2 days.    Anatomy Scan 2023: Impression: jacobsen living IUP is identified.   Fetal size is appropriate for established dating. No fetal structural malformations are identified. Cervical length by TA scanning is normal. Placental location is posterior without evidence of previa. Breeched presentation.    Assessment:   1. 38 weeks gestation of pregnancy        Plan:  - Prior : No  -Continue to attend all scheduled OB visits  -ED precautions for any RUQ pain, leakage of fluids, or severe  headache  -Should the patient need to visit the ED go to the OB ED at [facility]  - PNVs  - Urine dip reviewed as above  - Indicated labs: PENDING  - Mother plans to breast and/or bottlefeed  - Postpartum contraception discussion: Nexplanon  - Labor precautions discussed in depth  - Patient is GBS positive, plan for intrapartum abx  - Plan for NST next week  - FM Continuity patient after delivery: No  - Return to OB clinic in 1 week    Orders Placed This Encounter   Procedures    CBC Auto Differential    SYPHILIS ANTIBODY (WITH REFLEX RPR)    HIV 1/2 Ag/Ab (4th Gen)    POCT URINE DIPSTICK WITHOUT MICROSCOPE     Antwan Smith MD  [facility]

## 2023-10-24 NOTE — PROGRESS NOTES
Faculty addendum: Patient discussed with resident. Chart was reviewed including vitals, labs, etc. Care provided reasonable and necessary. I participated in the management of the patient and was immediately available throughout the encounter. Services were furnished in a primary care center located in the outpatient department of a [facility] hospital. I agree with the resident's findings and plan as documented in the resident's note.

## 2023-10-25 ENCOUNTER — ANESTHESIA EVENT (OUTPATIENT)
Dept: OBSTETRICS AND GYNECOLOGY | Facility: HOSPITAL | Age: 23
End: 2023-10-25
Payer: MEDICAID

## 2023-10-25 ENCOUNTER — ANESTHESIA (OUTPATIENT)
Dept: OBSTETRICS AND GYNECOLOGY | Facility: HOSPITAL | Age: 23
End: 2023-10-25
Payer: MEDICAID

## 2023-10-25 ENCOUNTER — HOSPITAL ENCOUNTER (INPATIENT)
Facility: HOSPITAL | Age: 23
LOS: 2 days | Discharge: HOME | End: 2023-10-27
Attending: OBSTETRICS & GYNECOLOGY | Admitting: OBSTETRICS & GYNECOLOGY
Payer: MEDICAID

## 2023-10-25 VITALS — RESPIRATION RATE: 10 BRPM

## 2023-10-25 DIAGNOSIS — R87.810 ASCUS WITH POSITIVE HIGH RISK HPV CERVICAL: Primary | ICD-10-CM

## 2023-10-25 DIAGNOSIS — Z37.9 NORMAL LABOR: ICD-10-CM

## 2023-10-25 DIAGNOSIS — F32.A DEPRESSION AFFECTING PREGNANCY: ICD-10-CM

## 2023-10-25 DIAGNOSIS — O99.340 DEPRESSION AFFECTING PREGNANCY: ICD-10-CM

## 2023-10-25 DIAGNOSIS — R87.610 ASCUS WITH POSITIVE HIGH RISK HPV CERVICAL: Primary | ICD-10-CM

## 2023-10-25 PROBLEM — B95.1 POSITIVE GBS TEST: Status: ACTIVE | Noted: 2023-10-25

## 2023-10-25 LAB
AMPHET UR QL SCN: NEGATIVE
BARBITURATE SCN PRESENT UR: NEGATIVE
BASOPHILS # BLD AUTO: 0.03 X10(3)/MCL
BASOPHILS NFR BLD AUTO: 0.4 %
BENZODIAZ UR QL SCN: NEGATIVE
CANNABINOIDS UR QL SCN: NEGATIVE
COCAINE UR QL SCN: NEGATIVE
EOSINOPHIL # BLD AUTO: 0.09 X10(3)/MCL (ref 0–0.9)
EOSINOPHIL NFR BLD AUTO: 1.3 %
ERYTHROCYTE [DISTWIDTH] IN BLOOD BY AUTOMATED COUNT: 13.4 % (ref 11.5–17)
FENTANYL UR QL SCN: NEGATIVE
GROUP & RH: NORMAL
HCT VFR BLD AUTO: 36.2 % (ref 37–47)
HGB BLD-MCNC: 12 G/DL (ref 12–16)
IMM GRANULOCYTES # BLD AUTO: 0.05 X10(3)/MCL (ref 0–0.04)
IMM GRANULOCYTES NFR BLD AUTO: 0.7 %
INDIRECT COOMBS GEL: NORMAL
LYMPHOCYTES # BLD AUTO: 1.45 X10(3)/MCL (ref 0.6–4.6)
LYMPHOCYTES NFR BLD AUTO: 21.7 %
MCH RBC QN AUTO: 30.2 PG (ref 27–31)
MCHC RBC AUTO-ENTMCNC: 33.1 G/DL (ref 33–36)
MCV RBC AUTO: 91.2 FL (ref 80–94)
MDMA UR QL SCN: NEGATIVE
MONOCYTES # BLD AUTO: 0.42 X10(3)/MCL (ref 0.1–1.3)
MONOCYTES NFR BLD AUTO: 6.3 %
NEUTROPHILS # BLD AUTO: 4.64 X10(3)/MCL (ref 2.1–9.2)
NEUTROPHILS NFR BLD AUTO: 69.6 %
NRBC BLD AUTO-RTO: 0 %
OPIATES UR QL SCN: NEGATIVE
PCP UR QL: NEGATIVE
PH UR: 7.5 [PH] (ref 3–11)
PLATELET # BLD AUTO: 143 X10(3)/MCL (ref 130–400)
PMV BLD AUTO: 14.1 FL (ref 7.4–10.4)
RBC # BLD AUTO: 3.97 X10(6)/MCL (ref 4.2–5.4)
SPECIMEN OUTDATE: NORMAL
T PALLIDUM AB SER QL: NONREACTIVE
WBC # SPEC AUTO: 6.68 X10(3)/MCL (ref 4.5–11.5)

## 2023-10-25 PROCEDURE — 25000003 PHARM REV CODE 250: Performed by: ANESTHESIOLOGY

## 2023-10-25 PROCEDURE — 63600175 PHARM REV CODE 636 W HCPCS: Performed by: OBSTETRICS & GYNECOLOGY

## 2023-10-25 PROCEDURE — 51702 INSERT TEMP BLADDER CATH: CPT

## 2023-10-25 PROCEDURE — 11000001 HC ACUTE MED/SURG PRIVATE ROOM

## 2023-10-25 PROCEDURE — 86780 TREPONEMA PALLIDUM: CPT | Performed by: OBSTETRICS & GYNECOLOGY

## 2023-10-25 PROCEDURE — 63600175 PHARM REV CODE 636 W HCPCS: Performed by: ANESTHESIOLOGY

## 2023-10-25 PROCEDURE — 72200004 HC VAGINAL DELIVERY LEVEL I

## 2023-10-25 PROCEDURE — 59409 OBSTETRICAL CARE: CPT | Mod: AA,,, | Performed by: ANESTHESIOLOGY

## 2023-10-25 PROCEDURE — 25000003 PHARM REV CODE 250: Performed by: OBSTETRICS & GYNECOLOGY

## 2023-10-25 PROCEDURE — 72100002 HC LABOR CARE, 1ST 8 HOURS

## 2023-10-25 PROCEDURE — 96374 THER/PROPH/DIAG INJ IV PUSH: CPT

## 2023-10-25 PROCEDURE — 96361 HYDRATE IV INFUSION ADD-ON: CPT

## 2023-10-25 PROCEDURE — 59409 PRA ETRICAL CARE,VAG DELIV ONLY: ICD-10-PCS | Mod: AA,,, | Performed by: ANESTHESIOLOGY

## 2023-10-25 PROCEDURE — 85025 COMPLETE CBC W/AUTO DIFF WBC: CPT | Performed by: OBSTETRICS & GYNECOLOGY

## 2023-10-25 PROCEDURE — 62326 NJX INTERLAMINAR LMBR/SAC: CPT | Performed by: ANESTHESIOLOGY

## 2023-10-25 PROCEDURE — 80307 DRUG TEST PRSMV CHEM ANLYZR: CPT | Performed by: OBSTETRICS & GYNECOLOGY

## 2023-10-25 PROCEDURE — 99285 EMERGENCY DEPT VISIT HI MDM: CPT | Mod: 25

## 2023-10-25 PROCEDURE — 86900 BLOOD TYPING SEROLOGIC ABO: CPT | Performed by: OBSTETRICS & GYNECOLOGY

## 2023-10-25 RX ORDER — METHYLERGONOVINE MALEATE 0.2 MG/ML
200 INJECTION INTRAVENOUS ONCE AS NEEDED
Status: DISCONTINUED | OUTPATIENT
Start: 2023-10-25 | End: 2023-10-25

## 2023-10-25 RX ORDER — DEXTROSE, SODIUM CHLORIDE, SODIUM LACTATE, POTASSIUM CHLORIDE, AND CALCIUM CHLORIDE 5; .6; .31; .03; .02 G/100ML; G/100ML; G/100ML; G/100ML; G/100ML
INJECTION, SOLUTION INTRAVENOUS CONTINUOUS
Status: DISCONTINUED | OUTPATIENT
Start: 2023-10-25 | End: 2023-10-27 | Stop reason: HOSPADM

## 2023-10-25 RX ORDER — PRENATAL WITH FERROUS FUM AND FOLIC ACID 3080; 920; 120; 400; 22; 1.84; 3; 20; 10; 1; 12; 200; 27; 25; 2 [IU]/1; [IU]/1; MG/1; [IU]/1; MG/1; MG/1; MG/1; MG/1; MG/1; MG/1; UG/1; MG/1; MG/1; MG/1; MG/1
1 TABLET ORAL DAILY
Status: DISCONTINUED | OUTPATIENT
Start: 2023-10-26 | End: 2023-10-27 | Stop reason: HOSPADM

## 2023-10-25 RX ORDER — MISOPROSTOL 100 UG/1
800 TABLET ORAL ONCE AS NEEDED
Status: DISCONTINUED | OUTPATIENT
Start: 2023-10-25 | End: 2023-10-25

## 2023-10-25 RX ORDER — OXYTOCIN/RINGER'S LACTATE 30/500 ML
95 PLASTIC BAG, INJECTION (ML) INTRAVENOUS ONCE AS NEEDED
Status: DISCONTINUED | OUTPATIENT
Start: 2023-10-25 | End: 2023-10-27 | Stop reason: HOSPADM

## 2023-10-25 RX ORDER — FENTANYL/BUPIVACAINE/NS/PF 2-1250MCG
PLASTIC BAG, INJECTION (ML) INJECTION CONTINUOUS PRN
Status: DISCONTINUED | OUTPATIENT
Start: 2023-10-25 | End: 2023-10-25

## 2023-10-25 RX ORDER — BUPIVACAINE HYDROCHLORIDE 2.5 MG/ML
INJECTION, SOLUTION EPIDURAL; INFILTRATION; INTRACAUDAL
Status: COMPLETED | OUTPATIENT
Start: 2023-10-25 | End: 2023-10-25

## 2023-10-25 RX ORDER — OXYTOCIN/RINGER'S LACTATE 30/500 ML
95 PLASTIC BAG, INJECTION (ML) INTRAVENOUS ONCE
Status: DISCONTINUED | OUTPATIENT
Start: 2023-10-25 | End: 2023-10-27 | Stop reason: HOSPADM

## 2023-10-25 RX ORDER — SODIUM CHLORIDE, SODIUM LACTATE, POTASSIUM CHLORIDE, CALCIUM CHLORIDE 600; 310; 30; 20 MG/100ML; MG/100ML; MG/100ML; MG/100ML
INJECTION, SOLUTION INTRAVENOUS CONTINUOUS
Status: DISCONTINUED | OUTPATIENT
Start: 2023-10-25 | End: 2023-10-27 | Stop reason: HOSPADM

## 2023-10-25 RX ORDER — SIMETHICONE 80 MG
1 TABLET,CHEWABLE ORAL 4 TIMES DAILY PRN
Status: DISCONTINUED | OUTPATIENT
Start: 2023-10-25 | End: 2023-10-25 | Stop reason: SDUPTHER

## 2023-10-25 RX ORDER — SODIUM CHLORIDE 9 MG/ML
INJECTION, SOLUTION INTRAVENOUS
Status: DISCONTINUED | OUTPATIENT
Start: 2023-10-25 | End: 2023-10-25

## 2023-10-25 RX ORDER — PROCHLORPERAZINE EDISYLATE 5 MG/ML
5 INJECTION INTRAMUSCULAR; INTRAVENOUS EVERY 6 HOURS PRN
Status: DISCONTINUED | OUTPATIENT
Start: 2023-10-25 | End: 2023-10-27 | Stop reason: HOSPADM

## 2023-10-25 RX ORDER — DIPHENHYDRAMINE HYDROCHLORIDE 50 MG/ML
25 INJECTION INTRAMUSCULAR; INTRAVENOUS EVERY 4 HOURS PRN
Status: DISCONTINUED | OUTPATIENT
Start: 2023-10-25 | End: 2023-10-27 | Stop reason: HOSPADM

## 2023-10-25 RX ORDER — DOCUSATE SODIUM 100 MG/1
200 CAPSULE, LIQUID FILLED ORAL 2 TIMES DAILY PRN
Status: DISCONTINUED | OUTPATIENT
Start: 2023-10-25 | End: 2023-10-27 | Stop reason: HOSPADM

## 2023-10-25 RX ORDER — DIPHENOXYLATE HYDROCHLORIDE AND ATROPINE SULFATE 2.5; .025 MG/1; MG/1
2 TABLET ORAL EVERY 6 HOURS PRN
Status: DISCONTINUED | OUTPATIENT
Start: 2023-10-25 | End: 2023-10-27 | Stop reason: HOSPADM

## 2023-10-25 RX ORDER — PROCHLORPERAZINE EDISYLATE 5 MG/ML
5 INJECTION INTRAMUSCULAR; INTRAVENOUS EVERY 6 HOURS PRN
Status: DISCONTINUED | OUTPATIENT
Start: 2023-10-25 | End: 2023-10-25

## 2023-10-25 RX ORDER — DIPHENHYDRAMINE HCL 25 MG
25 CAPSULE ORAL EVERY 4 HOURS PRN
Status: DISCONTINUED | OUTPATIENT
Start: 2023-10-25 | End: 2023-10-27 | Stop reason: HOSPADM

## 2023-10-25 RX ORDER — DIPHENOXYLATE HYDROCHLORIDE AND ATROPINE SULFATE 2.5; .025 MG/1; MG/1
2 TABLET ORAL EVERY 6 HOURS PRN
Status: DISCONTINUED | OUTPATIENT
Start: 2023-10-25 | End: 2023-10-25

## 2023-10-25 RX ORDER — ACETAMINOPHEN 325 MG/1
650 TABLET ORAL EVERY 6 HOURS PRN
Status: DISCONTINUED | OUTPATIENT
Start: 2023-10-25 | End: 2023-10-27 | Stop reason: HOSPADM

## 2023-10-25 RX ORDER — IBUPROFEN 600 MG/1
600 TABLET ORAL EVERY 6 HOURS
Status: DISCONTINUED | OUTPATIENT
Start: 2023-10-26 | End: 2023-10-27 | Stop reason: HOSPADM

## 2023-10-25 RX ORDER — OXYTOCIN/RINGER'S LACTATE 30/500 ML
334 PLASTIC BAG, INJECTION (ML) INTRAVENOUS ONCE
Status: COMPLETED | OUTPATIENT
Start: 2023-10-25 | End: 2023-10-25

## 2023-10-25 RX ORDER — OXYTOCIN/RINGER'S LACTATE 30/500 ML
334 PLASTIC BAG, INJECTION (ML) INTRAVENOUS ONCE AS NEEDED
Status: DISCONTINUED | OUTPATIENT
Start: 2023-10-25 | End: 2023-10-27 | Stop reason: HOSPADM

## 2023-10-25 RX ORDER — OXYTOCIN/RINGER'S LACTATE 30/500 ML
95 PLASTIC BAG, INJECTION (ML) INTRAVENOUS ONCE AS NEEDED
Status: DISCONTINUED | OUTPATIENT
Start: 2023-10-25 | End: 2023-10-25

## 2023-10-25 RX ORDER — SODIUM CHLORIDE 0.9 % (FLUSH) 0.9 %
2 SYRINGE (ML) INJECTION EVERY 6 HOURS PRN
Status: DISCONTINUED | OUTPATIENT
Start: 2023-10-25 | End: 2023-10-27 | Stop reason: HOSPADM

## 2023-10-25 RX ORDER — CALCIUM CARBONATE 200(500)MG
500 TABLET,CHEWABLE ORAL 3 TIMES DAILY PRN
Status: DISCONTINUED | OUTPATIENT
Start: 2023-10-25 | End: 2023-10-27 | Stop reason: HOSPADM

## 2023-10-25 RX ORDER — HYDROCORTISONE 25 MG/G
CREAM TOPICAL 3 TIMES DAILY PRN
Status: DISCONTINUED | OUTPATIENT
Start: 2023-10-25 | End: 2023-10-27 | Stop reason: HOSPADM

## 2023-10-25 RX ORDER — CARBOPROST TROMETHAMINE 250 UG/ML
250 INJECTION, SOLUTION INTRAMUSCULAR
Status: DISCONTINUED | OUTPATIENT
Start: 2023-10-25 | End: 2023-10-27 | Stop reason: HOSPADM

## 2023-10-25 RX ORDER — PENICILLIN G 3000000 [IU]/50ML
3 INJECTION, SOLUTION INTRAVENOUS
Status: DISCONTINUED | OUTPATIENT
Start: 2023-10-25 | End: 2023-10-27 | Stop reason: HOSPADM

## 2023-10-25 RX ORDER — OXYTOCIN 10 [USP'U]/ML
10 INJECTION, SOLUTION INTRAMUSCULAR; INTRAVENOUS ONCE AS NEEDED
Status: DISCONTINUED | OUTPATIENT
Start: 2023-10-25 | End: 2023-10-27 | Stop reason: HOSPADM

## 2023-10-25 RX ORDER — HYDROCODONE BITARTRATE AND ACETAMINOPHEN 10; 325 MG/1; MG/1
1 TABLET ORAL EVERY 4 HOURS PRN
Status: DISCONTINUED | OUTPATIENT
Start: 2023-10-25 | End: 2023-10-27 | Stop reason: HOSPADM

## 2023-10-25 RX ORDER — OXYTOCIN/RINGER'S LACTATE 30/500 ML
334 PLASTIC BAG, INJECTION (ML) INTRAVENOUS ONCE AS NEEDED
Status: DISCONTINUED | OUTPATIENT
Start: 2023-10-25 | End: 2023-10-25

## 2023-10-25 RX ORDER — ONDANSETRON 4 MG/1
8 TABLET, ORALLY DISINTEGRATING ORAL EVERY 8 HOURS PRN
Status: DISCONTINUED | OUTPATIENT
Start: 2023-10-25 | End: 2023-10-27 | Stop reason: HOSPADM

## 2023-10-25 RX ORDER — LIDOCAINE HYDROCHLORIDE 10 MG/ML
10 INJECTION INFILTRATION; PERINEURAL ONCE AS NEEDED
Status: DISCONTINUED | OUTPATIENT
Start: 2023-10-25 | End: 2023-10-27 | Stop reason: HOSPADM

## 2023-10-25 RX ORDER — CARBOPROST TROMETHAMINE 250 UG/ML
250 INJECTION, SOLUTION INTRAMUSCULAR
Status: DISCONTINUED | OUTPATIENT
Start: 2023-10-25 | End: 2023-10-25

## 2023-10-25 RX ORDER — METHYLERGONOVINE MALEATE 0.2 MG/ML
200 INJECTION INTRAVENOUS ONCE AS NEEDED
Status: DISCONTINUED | OUTPATIENT
Start: 2023-10-25 | End: 2023-10-27 | Stop reason: HOSPADM

## 2023-10-25 RX ORDER — OXYTOCIN 10 [USP'U]/ML
10 INJECTION, SOLUTION INTRAMUSCULAR; INTRAVENOUS ONCE AS NEEDED
Status: DISCONTINUED | OUTPATIENT
Start: 2023-10-25 | End: 2023-10-25

## 2023-10-25 RX ORDER — HYDROCODONE BITARTRATE AND ACETAMINOPHEN 5; 325 MG/1; MG/1
1 TABLET ORAL EVERY 4 HOURS PRN
Status: DISCONTINUED | OUTPATIENT
Start: 2023-10-25 | End: 2023-10-27 | Stop reason: HOSPADM

## 2023-10-25 RX ORDER — ONDANSETRON 4 MG/1
8 TABLET, ORALLY DISINTEGRATING ORAL EVERY 8 HOURS PRN
Status: DISCONTINUED | OUTPATIENT
Start: 2023-10-25 | End: 2023-10-25

## 2023-10-25 RX ORDER — MISOPROSTOL 100 UG/1
800 TABLET ORAL ONCE AS NEEDED
Status: DISCONTINUED | OUTPATIENT
Start: 2023-10-25 | End: 2023-10-27 | Stop reason: HOSPADM

## 2023-10-25 RX ORDER — MEPERIDINE HYDROCHLORIDE 25 MG/ML
50 INJECTION INTRAMUSCULAR; INTRAVENOUS; SUBCUTANEOUS ONCE
Status: COMPLETED | OUTPATIENT
Start: 2023-10-25 | End: 2023-10-25

## 2023-10-25 RX ORDER — SODIUM CITRATE AND CITRIC ACID MONOHYDRATE 334; 500 MG/5ML; MG/5ML
30 SOLUTION ORAL ONCE
Status: COMPLETED | OUTPATIENT
Start: 2023-10-25 | End: 2023-10-25

## 2023-10-25 RX ORDER — SIMETHICONE 80 MG
1 TABLET,CHEWABLE ORAL EVERY 6 HOURS PRN
Status: DISCONTINUED | OUTPATIENT
Start: 2023-10-25 | End: 2023-10-27 | Stop reason: HOSPADM

## 2023-10-25 RX ADMIN — BUPIVACAINE HYDROCHLORIDE 7 ML: 2.5 INJECTION, SOLUTION EPIDURAL; INFILTRATION; INTRACAUDAL; PERINEURAL at 04:10

## 2023-10-25 RX ADMIN — Medication: at 11:10

## 2023-10-25 RX ADMIN — SODIUM CHLORIDE, POTASSIUM CHLORIDE, SODIUM LACTATE AND CALCIUM CHLORIDE: 600; 310; 30; 20 INJECTION, SOLUTION INTRAVENOUS at 01:10

## 2023-10-25 RX ADMIN — SODIUM CITRATE AND CITRIC ACID MONOHYDRATE 30 ML: 500; 334 SOLUTION ORAL at 03:10

## 2023-10-25 RX ADMIN — PENICILLIN G 3 MILLION UNITS: 3000000 INJECTION, SOLUTION INTRAVENOUS at 06:10

## 2023-10-25 RX ADMIN — SODIUM CHLORIDE, POTASSIUM CHLORIDE, SODIUM LACTATE AND CALCIUM CHLORIDE: 600; 310; 30; 20 INJECTION, SOLUTION INTRAVENOUS at 04:10

## 2023-10-25 RX ADMIN — MEPERIDINE HYDROCHLORIDE 50 MG: 25 INJECTION INTRAMUSCULAR; INTRAVENOUS; SUBCUTANEOUS at 01:10

## 2023-10-25 RX ADMIN — Medication 334 MILLI-UNITS/MIN: at 09:10

## 2023-10-25 RX ADMIN — DEXTROSE MONOHYDRATE 5 MILLION UNITS: 5 INJECTION INTRAVENOUS at 03:10

## 2023-10-25 RX ADMIN — SODIUM CHLORIDE, POTASSIUM CHLORIDE, SODIUM LACTATE AND CALCIUM CHLORIDE 1000 ML: 600; 310; 30; 20 INJECTION, SOLUTION INTRAVENOUS at 03:10

## 2023-10-25 RX ADMIN — Medication 10 ML/HR: at 04:10

## 2023-10-25 RX ADMIN — IBUPROFEN 600 MG: 600 TABLET, FILM COATED ORAL at 11:10

## 2023-10-25 NOTE — ANESTHESIA PROCEDURE NOTES
Epidural    Patient location during procedure: OB   Reason for block: primary anesthetic   Reason for block: at surgeon's request, labor analgesia requested by patient and obstetrician  Diagnosis: active labor   Start time: 10/25/2023 4:30 PM  Timeout: 10/25/2023 4:30 PM  End time: 10/25/2023 4:45 PM    Staffing  Performing Provider: Jv Nicole DO  Authorizing Provider: Jv Nicole DO    Staffing  Performed by: Jv Nicole DO  Authorized by: Jv Nicole DO        Preanesthetic Checklist  Completed: patient identified, IV checked, site marked, risks and benefits discussed, surgical consent, monitors and equipment checked, pre-op evaluation, timeout performed, anesthesia consent given, hand hygiene performed and patient being monitored  Preparation  Patient position: sitting  Prep: ChloraPrep  Patient monitoring: ECG, Pulse Ox and Blood Pressure  Reason for block: primary anesthetic   Epidural  Skin Anesthetic: lidocaine 1%  Skin Wheal: 1 mL  Administration type: single shot  Approach: midline  Interspace: L3-4    Injection technique: SG air and GS saline  Needle and Epidural Catheter  Needle type: Tuohy   Needle gauge: 17  Needle length: 3.5 inches  Catheter type: multi-orifice  Catheter size: 20 G  Insertion Attempts: 1  Test dose: 5 mL of lidocaine 1.5% with Epi 1-to-200,000  Additional Documentation: incremental injection, no paresthesia on injection, no significant pain on injection, negative aspiration for heme and CSF, no signs/symptoms of IV or SA injection and no significant complaints from patient  Needle localization: anatomical landmarks  Assessment  Ease of block: difficult  Patient's tolerance of the procedure: comfortable throughout block and no complaints No inadvertent dural puncture with Tuohy.  Dural puncture not performed with spinal needle    Medications:    Medications: bupivacaine (pf) (MARCAINE) injection 0.25% - Epidural   7 mL - 10/25/2023 4:40:00 PM

## 2023-10-25 NOTE — ED PROVIDER NOTES
"JESUS NOTE     Admit Date: 10/25/2023  JESUS Physician: Stefano Pena  Primary OBGYN: [facility]    Admit Diagnosis/Chief Complaint: Contractions      Chief Complaint   Patient presents with    Abdominal Pain     IUP 40wks, complaints of abdominal pain since 0700 every 3 minutes lasting 1 minutes. Patient states good fetal movement, denies vaginal bleeding and leaking of fluid.        Hospital Course:  Shakir Llanos is a 22 y.o.  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    Patient denies vaginal bleeding and leakage of fluid.  Fetal Movement: normal.    /66   Pulse 93   Temp 97.6 °F (36.4 °C)   Ht 5' 3" (1.6 m)   Wt 64.9 kg (143 lb)   LMP 2023 (Exact Date) Comment: Reports irregular period. Requesting UPT  Breastfeeding No   BMI 25.33 kg/m²   Temp:  [97.6 °F (36.4 °C)] 97.6 °F (36.4 °C)  Pulse:  [93] 93  BP: (117)/(66) 117/66    General: in no apparent distress  Abdominal: soft, nontender, nondistended, no abnormal masses, no epigastric pain FHT present  Back: lumbar tenderness absent   CVA tenderness none  Extremeties no redness or tenderness in the calves or thighs no edema    SSE:   SVE:SVE (PeriWATCH)  Dilation (cm): 2.5  Effacement (%): 70  Station: -2  Cervical Position: Posterior  Cervical Consistency: Medium  Examined by:: CAREN Saucedo RN  Chaperone in room: RAMIRO Lake rN  Fierro Score: 5  Simplified Fierro Score: 4     FHT:  Reactive  TOCO: Contractions every 5-8 min      LABS:   No results found for this or any previous visit (from the past 24 hour(s)).  [unfilled]     Imaging Results    None          ASSESMENT: Shakir Llanos is a 22 y.o.   at 40w0d with Contractions  Observation in JESUS  Rule out labor  We will reevaluate cervix if margi  If no contractions or cervical change, will discharge from hospital  Labor precautions reviewed with patient  ER precautions reviewed with patient  Patient was given an " opportunity to ask questions  She is to follow-up with her primary care physician        Discharge Diagnosis/Clinical Impression**: Rule Out Labor, Contractions in Pregnancy, False Labor  Status:Stable    Patient Instructions:       - Pt was given routine pregnancy instructions including to return to triage if she had any vaginal bleeding (other than spotting for the next 48hrs), any loss of fluid like her water broke, decreased fetal movement, or contractions Q 5min lasting for 2 or more hours. Pt was also instructed to drink copious water. Patient voiced understanding of all these instructions and was subsequently discharged home.    She will follow up with her primary OB.      This note was created with the assistance of [employer] voice recognition software. There may be transcription errors as a result of using this technology however minimal. Effort has been made to assure accuracy of transcription but any obvious errors or omissions should be clarified with the author of the document.

## 2023-10-25 NOTE — H&P
"LnD HnP     Admit Date: 10/25/2023  JESUS Physician: Stefano Pena  Primary OBGYN: [facility]    Admit Diagnosis/Chief Complaint: Contractions      Chief Complaint   Patient presents with    Abdominal Pain     IUP 40wks, complaints of abdominal pain since 0700 every 3 minutes lasting 1 minutes. Patient states good fetal movement, denies vaginal bleeding and leaking of fluid.        Hospital Course:  Shakir Llanos is a 22 y.o.  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    Patient denies vaginal bleeding and leakage of fluid.  Fetal Movement: normal.    /68   Pulse 73   Temp 97.6 °F (36.4 °C)   Resp 16   Ht 5' 3" (1.6 m)   Wt 64.9 kg (143 lb)   LMP 2023 (Exact Date) Comment: Reports irregular period. Requesting UPT  Breastfeeding No   BMI 25.33 kg/m²   Temp:  [97.6 °F (36.4 °C)] 97.6 °F (36.4 °C)  Pulse:  [73-93] 73  Resp:  [16] 16  BP: (109-117)/(66-68) 109/68    General: in no apparent distress  Abdominal: soft, nontender, nondistended, no abnormal masses, no epigastric pain FHT present  Back: lumbar tenderness absent   CVA tenderness none  Extremeties no redness or tenderness in the calves or thighs no edema    SSE:   SVE:SVE (PeriWATCH)  Dilation (cm): 3  Effacement (%): 70  Station: -2  Cervical Position: Posterior  Cervical Consistency: Medium  Examined by:: Neisha Saucedo RN  Chaperone in room: RAMIRO Lake RN  Fierro Score: 6  Simplified Fierro Score: 5     FHT:  Reactive  TOCO: Contractions every 5-8 min      LABS:   No results found for this or any previous visit (from the past 24 hour(s)).  [unfilled]     Imaging Results    None          ASSESMENT: Shakir Llanos is a 22 y.o.   at 40w0d with Contractions  Patient in labor   Admit to Labor and delivery   IV fluids  Admission labs   GBS positive   Epidural as needed     "

## 2023-10-26 LAB
BASOPHILS # BLD AUTO: 0.05 X10(3)/MCL
BASOPHILS NFR BLD AUTO: 0.4 %
EOSINOPHIL # BLD AUTO: 0.15 X10(3)/MCL (ref 0–0.9)
EOSINOPHIL NFR BLD AUTO: 1.1 %
ERYTHROCYTE [DISTWIDTH] IN BLOOD BY AUTOMATED COUNT: 13.4 % (ref 11.5–17)
HCT VFR BLD AUTO: 31.1 % (ref 37–47)
HGB BLD-MCNC: 10.6 G/DL (ref 12–16)
IMM GRANULOCYTES # BLD AUTO: 0.09 X10(3)/MCL (ref 0–0.04)
IMM GRANULOCYTES NFR BLD AUTO: 0.7 %
LYMPHOCYTES # BLD AUTO: 1.89 X10(3)/MCL (ref 0.6–4.6)
LYMPHOCYTES NFR BLD AUTO: 14.2 %
MCH RBC QN AUTO: 30.2 PG (ref 27–31)
MCHC RBC AUTO-ENTMCNC: 34.1 G/DL (ref 33–36)
MCV RBC AUTO: 88.6 FL (ref 80–94)
MONOCYTES # BLD AUTO: 0.83 X10(3)/MCL (ref 0.1–1.3)
MONOCYTES NFR BLD AUTO: 6.2 %
NEUTROPHILS # BLD AUTO: 10.3 X10(3)/MCL (ref 2.1–9.2)
NEUTROPHILS NFR BLD AUTO: 77.4 %
NRBC BLD AUTO-RTO: 0 %
PLATELET # BLD AUTO: 135 X10(3)/MCL (ref 130–400)
PMV BLD AUTO: 13.8 FL (ref 7.4–10.4)
RBC # BLD AUTO: 3.51 X10(6)/MCL (ref 4.2–5.4)
WBC # SPEC AUTO: 13.31 X10(3)/MCL (ref 4.5–11.5)

## 2023-10-26 PROCEDURE — 85025 COMPLETE CBC W/AUTO DIFF WBC: CPT | Performed by: OBSTETRICS & GYNECOLOGY

## 2023-10-26 PROCEDURE — 25000003 PHARM REV CODE 250: Performed by: OBSTETRICS & GYNECOLOGY

## 2023-10-26 PROCEDURE — 11000001 HC ACUTE MED/SURG PRIVATE ROOM

## 2023-10-26 RX ADMIN — IBUPROFEN 600 MG: 600 TABLET, FILM COATED ORAL at 12:10

## 2023-10-26 RX ADMIN — IBUPROFEN 600 MG: 600 TABLET, FILM COATED ORAL at 06:10

## 2023-10-26 RX ADMIN — DOCUSATE SODIUM 200 MG: 100 CAPSULE, LIQUID FILLED ORAL at 08:10

## 2023-10-26 NOTE — ANESTHESIA PREPROCEDURE EVALUATION
10/25/2023  Shakir Llanos is a 22 y.o., female.      Pre-op Assessment    I have reviewed the Patient Summary Reports.     I have reviewed the Nursing Notes. I have reviewed the NPO Status.   I have reviewed the Medications.     Review of Systems  Anesthesia Hx:  No problems with previous Anesthesia    Pulmonary:   Asthma    Psych:   Psychiatric History          Physical Exam  General: Cooperative, Well nourished, Alert and Oriented    Airway:  Mallampati: II   Mouth Opening: Normal  TM Distance: Normal  Tongue: Normal  Neck ROM: Normal ROM    Chest/Lungs:  Clear to auscultation    Heart:  Rate: Normal        Anesthesia Plan  Type of Anesthesia, risks & benefits discussed:    Anesthesia Type: Epidural  Intra-op Monitoring Plan: Standard ASA Monitors  Post Op Pain Control Plan: multimodal analgesia  ASA Score: 2  Day of Surgery Review of History & Physical: H&P Update referred to the surgeon/provider.    Ready For Surgery From Anesthesia Perspective.     .

## 2023-10-26 NOTE — PROGRESS NOTES
Admit Assessment    Patient Identification  Girl Shakir Llanos   :  10/25/2024  Admit Date:  10/25/2023  Attending Provider:  Karen Decker MD              Referral:    received case management consult for meconium drug screen assessment.    I met with mom, Shakir Llanos, in her post-partum room. Mom presented appropriate and was agreeable to assessment at this time. Mom used [facility] of OBGYN needs and plans to use [facility] for pediatric care. FOB is Pedrito García who will be signing birth certificate per Mom. This is Mom's first child and Mom reported she does not work outside of the home. Mom wishes to breastfeed and formula feed infant. Mom reported having WIC services, a car seat and a place for safe sleep for infant. Mom reported having reliable transportation for discharge and medical appts. Mom discussed having strong social support. Mom reported a hx of depression and anxiety which she receives therapy for through telehealth appts. Encouraged Mom to seek medical attention and lean on her support system should she feel necessary for increased anxiety or depressive symptoms. Mom reported a hx of THC use with last reported use in 2023. Positive UDS for mom in 2023. Explained legal obligation to make DCFS report should infant's MDS have positive results. Mom verbalized understanding. Provided support resource packet including information/resources for PPD/PPA, car seat safety and safe sleep practices. Mom denied any further social or resource needs at this time. Baby girl, Henrietta García was born via Vag. Delivery at 40 WGA weighing 7lb 2oz. Verified her face sheet information:      Living Situation:      Resides at [address] [place] [address] [address]  [place] [address] [place] [address], phone: [phone] (home).             History/Current Symptoms of Anxiety/Depression:   Discussed PPD and identifying symptoms and provided mom with PPD counseling resources and symptom brochure.        Identified Support:  Mom reported strong social support      History/Current Substance Use: Mom reported hx of THC use     Indications of Abuse/Neglect:  No indications present         Emotional/Behavioral/Cognitive Issues: Mom reported hx of depression and anxiety     Current RX Prescriptions: Prenatal vitamins and Iron    Adequate Discharge Transportation: yes     Mom's UDS: negative     Baby's UDS: negative     DCFS status: pending MDS results      Plan:     Patient/caregiver engaged in treatment planning process.      providing psychosocial and supportive counseling, resources, education, assistance and discharge planning as appropriate.  Patient/caregiver state understanding of  available resources,  following, remains available.        Patient/Caregiver informed of right to choose providers or agencies.  Patient/Caregiver provides permission to release any necessary information to [facility] and to Non-[facility] agencies as needed to facilitate patient care, treatment planning, and patient discharge planning.  Written and verbal resources provided.

## 2023-10-26 NOTE — ANESTHESIA POSTPROCEDURE EVALUATION
Anesthesia Post Evaluation    Patient: Shakir Llanos    Procedure(s) Performed: * No procedures listed *    Final Anesthesia Type: epidural      Patient location during evaluation: labor & delivery  Patient participation: Yes- Able to Participate  Level of consciousness: awake and alert  Post-procedure vital signs: reviewed and stable  Pain management: adequate  Airway patency: patent  CHAYO mitigation strategies: Multimodal analgesia  PONV status at discharge: No PONV  Anesthetic complications: no      Cardiovascular status: blood pressure returned to baseline and hemodynamically stable  Respiratory status: unassisted  Hydration status: euvolemic  Follow-up not needed.          Vitals Value Taken Time   /77 10/25/23 2149   Temp 37.1 °C (98.7 °F) 10/25/23 2013   Pulse 83 10/25/23 2149   Resp 18 10/25/23 2013   SpO2 86 % 10/25/23 2113   Vitals shown include unvalidated device data.      No case tracking events are documented in the log.      Pain/Tamela Score: Pain Rating Prior to Med Admin: 8 (10/25/2023  1:53 PM)

## 2023-10-26 NOTE — PLAN OF CARE
Problem: Breastfeeding  Goal: Effective Breastfeeding  Intervention: Promote Effective Breastfeeding  Flowsheets (Taken 10/26/2023 0440)  Breastfeeding Assistance: support offered  Parent/Child Attachment Promotion: positive reinforcement provided  Intervention: Support Exclusive Breastfeeding Success  Flowsheets (Taken 10/26/2023 0440)  Supportive Measures:   active listening utilized   counseling provided   verbalization of feelings encouraged

## 2023-10-26 NOTE — PROGRESS NOTES
PostPartum Progress Note        Subjective:      Post-Partum Day #1 after uncomplicated vaginal delivery.    Patient reports controlled discomfort at epidural site. Lochia decreasing. Bottle feeding. Pain is well controlled. Patient is ambulating. Tolerating Full Regular diet.Overall mother and baby are doing well.     Objective:      Temp:  [98.4 °F (36.9 °C)-98.9 °F (37.2 °C)] 98.9 °F (37.2 °C)  Pulse:  [] 80  Resp:  [10-18] 18  SpO2:  [98 %-100 %] 98 %  BP: (100-137)/(50-90) 102/67    Intake/Output Summary (Last 24 hours) at 10/26/2023 1103  Last data filed at 10/25/2023 2128  Gross per 24 hour   Intake --   Output 1300 ml   Net -1300 ml     Body mass index is 25.33 kg/m².    General: no acute distress  Abdomen: soft, non-tender, non-distended; Fundus firm and below the umbilicus  Extremities: non-tender, symmetric, no edema    Group & Rh   Date Value Ref Range Status   10/25/2023 O POS  Final     Recent Results (from the past 336 hour(s))   CBC with Differential    Collection Time: 10/26/23  4:55 AM   Result Value Ref Range    WBC 13.31 (H) 4.50 - 11.50 x10(3)/mcL    Hgb 10.6 (L) 12.0 - 16.0 g/dL    Hct 31.1 (L) 37.0 - 47.0 %    Platelet 135 130 - 400 x10(3)/mcL   CBC with Differential    Collection Time: 10/25/23  3:03 PM   Result Value Ref Range    WBC 6.68 4.50 - 11.50 x10(3)/mcL    Hgb 12.0 12.0 - 16.0 g/dL    Hct 36.2 (L) 37.0 - 47.0 %    Platelet 143 130 - 400 x10(3)/mcL   CBC with Differential    Collection Time: 10/16/23 12:00 PM   Result Value Ref Range    WBC 8.85 4.50 - 11.50 x10(3)/mcL    Hgb 12.4 12.0 - 16.0 g/dL    Hct 36.7 (L) 37.0 - 47.0 %    Platelet 158 130 - 400 x10(3)/mcL          Assessment:     22 y.o.  S/P  Post-Partum Day #1  - Doing Well      Plan:     1. Continue routine postpartum care  2. Plan for D/C in AM

## 2023-10-26 NOTE — OP NOTE
INR is 2.6. Per Dr. Abbasi, same dose and recheck in 2 weeks. Pt aware   OPERATIVE REPORT    Date of Procedure: 10/25/2023    Patient Name: Shakir Llanos    MRN: [id number]    Surgeon: Stefano Pena MD    Assistant:     Pre-Operative Diagnosis: 40 wk labor  Post-Operative Diagnosis: Same  Delivred    Anesthesia: Epidural    Procedure:       Complications: No    Estimated Blood Loss (EBL): 300           Findings: Live Female    Specimens: Cord Blood    Description:  Patient was prepped and draped in a normal sterile fashion. The 2nd stage of labor was within normal limits. The infant's head delivered in LEO without any difficulties along with the shoulders and the rest of the infant. No Nuchal cords and Clear fluid noted. Mouth and nose were suctioned on the abdomen. Cord was doubly clamped and cut. The infant was handed to the waiting staff. Cord blood taken. The placenta was extracted spontaneously. There was Midline 1st degree on the perineum and  no  laceration the labia no suture required hemostatic  . Estimated blood loss was 300 patient tolerated the procedure well and instrument and sponge count were correct patient was to recover in labor room for approximately 1 hour.

## 2023-10-27 VITALS
DIASTOLIC BLOOD PRESSURE: 71 MMHG | TEMPERATURE: 97 F | OXYGEN SATURATION: 98 % | BODY MASS INDEX: 25.34 KG/M2 | HEART RATE: 70 BPM | RESPIRATION RATE: 18 BRPM | WEIGHT: 143 LBS | HEIGHT: 63 IN | SYSTOLIC BLOOD PRESSURE: 111 MMHG

## 2023-10-27 PROBLEM — Z37.9 NORMAL LABOR: Status: ACTIVE | Noted: 2023-10-27

## 2023-10-27 PROCEDURE — 25000003 PHARM REV CODE 250: Performed by: OBSTETRICS & GYNECOLOGY

## 2023-10-27 RX ORDER — DOCUSATE SODIUM 100 MG/1
200 CAPSULE, LIQUID FILLED ORAL 2 TIMES DAILY PRN
Qty: 30 CAPSULE | Refills: 0 | Status: SHIPPED | OUTPATIENT
Start: 2023-10-27

## 2023-10-27 RX ORDER — IBUPROFEN 600 MG/1
600 TABLET ORAL EVERY 6 HOURS PRN
Qty: 30 TABLET | Refills: 0 | Status: SHIPPED | OUTPATIENT
Start: 2023-10-27

## 2023-10-27 RX ADMIN — IBUPROFEN 600 MG: 600 TABLET, FILM COATED ORAL at 05:10

## 2023-10-27 RX ADMIN — IBUPROFEN 600 MG: 600 TABLET, FILM COATED ORAL at 12:10

## 2023-10-27 NOTE — LACTATION NOTE
"This note was copied from a baby's chart.  Mom nursing baby, good latch noted. Mom says baby has been latching well and feeding often. Verbalized a comfortable latch. Discharge instructions reviewed. Answered moms questions. Verbalized understanding of all.    [facility]        [phone]  Discharge Instructions    Watch for early feeding cues (rooting, hand to mouth, smacking lips, sticking out tongue). Offer the breast at the first signs of hunger. Crying is a late sign of hunger; don't wait until then.    Feed your baby at least 8-12 times in a 24-hour period. Feeding early and often will ensure a plentiful milk supply for you and your baby and will prevent engorgement in the coming days.  Do not limit or schedule feedings.    "Cluster feeding" is normal; baby may nurse very often for several times in a row. This commonly occurs in the evening or early part of the night.    Allow your baby to finish one side before offering the other. You can try to burp the baby and then offer the other breast if he/ she seems to still be hungry.     Skin to skin contact helps a sleepy baby want to nurse. Babies who are frequently held skin to skin nurse better and longer. Skin to skin increases mom's milk-making hormone levels as well. Skin to skin can help calm baby too.     By the end of the first week, you want to see 6-8 wet diapers per day and 3-5 yellow, seedy stools (stools will change from black to green to yellow by the end of the 1st week. Refer to chart in breastfeeding booklet to see how many wet/ dirty diapers baby should be having each day. Notify pediatrician if baby is not having enough wet and dirty diapers.    It is best to avoid bottles and pacifiers for the first 4 weeks while getting breastfeeding established.     Back to work or school: 4 weeks is a good time to start pumping after morning feeds in order to store milk for baby, although you may pump before if needed. Around 4-6 weeks is a good " time to introduce a bottle of pumped milk to baby if you will go back to work or school.     You should feel a tugging or pulling sensation when your baby nurses; it should never feel sharp, pinching, or singing. If there is pain, try to adjust the latch. Make sure your baby opens his mouth wide to latch on. His lips should be flanged out, like a fish. (You may want to refer to the handouts in your packet or view latch videos at [employer] or [employer].    Listen for swallowing. This indicates your baby is transferring that milk!     Your milk will increase between days 3-5. Frequent feeds can help with engorgement.     If your breasts begin to get engorged, place warm cloths on them or  a warm shower before feeding. This will help the milk begin to flow. Feed often to drain the breasts. After feeding, you may use cold packs for 10-15 minutes to reduce swelling. You may also want to pump for comfort; don't overdo it- just pump enough to relieve the fullness.     No soap or lotions to the nipples except for medical grade lanolin or nipple cream for soreness.     All babies go through growth spurts. The first one is generally around 2-3 weeks. If your baby starts to nurse a lot more than usual, this is likely the reason. Growth spurts happen every so often and usually last for 3-5 days.     Remember to check the safety of any medications, prescription or non-prescription (including herbals), before you take them. Your baby's pediatrician is the best one to confirm the safety of the medication while you are breastfeeding. You may also phone us. We can tell you about safety ratings that have been published regarding a particular medication. You may wish to phone the [facility] at [phone] to check the safety of a medication.     Call with any questions or concerns. Don't wait-- ask for help early. Breastfeeding Resources can be found on the last few pages of your Breastfeeding  Booklet given to you in the hospital.

## 2023-10-27 NOTE — DISCHARGE SUMMARY
Delivery Discharge Summary  Obstetrics      Primary OB Clinician:  MD TONY    Discharge Provider: Jacklyn Chavez MD    Admission date: 10/25/2023  Discharge date: 10/27/2023    Admit Dx:   Discharge Dx:    Patient Active Problem List   Diagnosis    Depression affecting pregnancy    ASCUS with positive high risk HPV cervical    Anemia    Positive GBS test    Normal labor       Procedure:     Hospital Course:  Shakir Lalnos is a 22 y.o. now  who was admitted on 10/25/2023 for delivery. Patient delivered a viable . Please see delivery note for further details. Pt was in stable condition post delivery and was transferred to the Mother-Baby Unit. Her postpartum course was uncomplicated. On the date of discharge, patient's pain is controlled with oral pain medications. She is tolerating ambulation without SOB or CP, and PO diet without N/V. Reported lochia is within the normal range. Pt desires Nexplanon placement for contraception. Pt in stable condition and ready for discharge.     Physical Exam  Cardiovascular:      Rate and Rhythm: Normal rate and regular rhythm.      Heart sounds: Normal heart sounds.   Pulmonary:      Effort: Pulmonary effort is normal.      Breath sounds: Normal breath sounds.   Abdominal:      General: Bowel sounds are normal.      Palpations: Abdomen is soft.      Tenderness: There is no abdominal tenderness. There is no guarding.      Comments: Fundus firm below umbilicus   Musculoskeletal:         General: No tenderness. Normal range of motion.      Right lower leg: No edema.      Left lower leg: No edema.   Neurological:      Mental Status: She is alert and oriented to person, place, and time.   Skin:     General: Skin is warm and dry.   Psychiatric:         Mood and Affect: Mood normal.         Behavior: Behavior normal.   Vitals and nursing note reviewed.          Pertinent studies:  Postpartum CBC  Lab Results   Component Value Date    WBC 13.31 (H) 10/26/2023    HGB  10.6 (L) 10/26/2023    HCT 31.1 (L) 10/26/2023    MCV 88.6 10/26/2023     10/26/2023           Disposition: To home, self care    Follow Up:  2, 6 weeks    Patient Instructions:   1. Call the office for any bleeding >2 pads/hour for >2 hours, temperature >100.4, pain that is uncontrolled with medications, or for any other concerns.  2. Pelvic rest and no tub baths x 6 weeks.  3. Call [facility] to schedule postpartum follow up.  4. Call to schedule follow up with mental health specialist    Current Discharge Medication List        START taking these medications    Details   docusate sodium (COLACE) 100 MG capsule Take 2 capsules (200 mg total) by mouth 2 (two) times daily as needed for Constipation.  Qty: 30 capsule, Refills: 0      ibuprofen (ADVIL,MOTRIN) 600 MG tablet Take 1 tablet (600 mg total) by mouth every 6 (six) hours as needed for Pain.  Qty: 30 tablet, Refills: 0           CONTINUE these medications which have NOT CHANGED    Details   M- PLUS 27 mg iron- 1 mg Tab Take 1 tablet (1 each total) by mouth once daily.  Qty: 30 tablet, Refills: 3    Associated Diagnoses: 37 weeks gestation of pregnancy           STOP taking these medications       ferrous sulfate (FEOSOL) 325 mg (65 mg iron) Tab tablet Comments:   Reason for Stopping:         lansoprazole (PREVACID) 15 MG capsule Comments:   Reason for Stopping:               Jacklyn Chavez, YAMILET

## 2023-10-27 NOTE — PLAN OF CARE
Problem: Adult Inpatient Plan of Care  Goal: Plan of Care Review  Outcome: Ongoing, Progressing  Goal: Patient-Specific Goal (Individualized)  Outcome: Ongoing, Progressing  Goal: Absence of Hospital-Acquired Illness or Injury  Outcome: Ongoing, Progressing  Goal: Optimal Comfort and Wellbeing  Outcome: Ongoing, Progressing  Goal: Readiness for Transition of Care  Outcome: Ongoing, Progressing     Problem: Infection  Goal: Absence of Infection Signs and Symptoms  Outcome: Ongoing, Progressing     Problem: Adjustment to Role Transition (Postpartum Vaginal Delivery)  Goal: Successful Maternal Role Transition  Outcome: Ongoing, Progressing     Problem: Bleeding (Postpartum Vaginal Delivery)  Goal: Hemostasis  Outcome: Ongoing, Progressing     Problem: Infection (Postpartum Vaginal Delivery)  Goal: Absence of Infection Signs/Symptoms  Outcome: Ongoing, Progressing     Problem: Pain (Postpartum Vaginal Delivery)  Goal: Acceptable Pain Control  Outcome: Ongoing, Progressing     Problem: Urinary Retention (Postpartum Vaginal Delivery)  Goal: Effective Urinary Elimination  Outcome: Ongoing, Progressing

## 2023-10-30 ENCOUNTER — PATIENT MESSAGE (OUTPATIENT)
Dept: ADMINISTRATIVE | Facility: OTHER | Age: 23
End: 2023-10-30
Payer: MEDICAID

## 2023-11-15 ENCOUNTER — PATIENT MESSAGE (OUTPATIENT)
Dept: FAMILY MEDICINE | Facility: CLINIC | Age: 23
End: 2023-11-15
Payer: MEDICAID

## 2023-11-27 NOTE — PROGRESS NOTES
Date of Service: 8/24/2023     Attending Attestation: Patient discussed with resident. The chart was reviewed thoroughly including pertinent vitals, labs, imaging, medications, prior notes, and consultant/specialist recommendations.  I participated in the management of the patient, reviewed the summary of the plan, and was immediately available at all times throughout the encounter. Services were furnished in a primary care center located in the outpatient department of a [facility] hospital. I agree with the resident's findings and plan as documented in the resident's note.    Valery Rankin MD  Attending - Family Medicine / Geriatric Medicine  [facility] [place], [facility]

## 2023-12-12 ENCOUNTER — OFFICE VISIT (OUTPATIENT)
Dept: FAMILY MEDICINE | Facility: CLINIC | Age: 23
End: 2023-12-12
Payer: MEDICAID

## 2023-12-12 VITALS
DIASTOLIC BLOOD PRESSURE: 72 MMHG | TEMPERATURE: 98 F | SYSTOLIC BLOOD PRESSURE: 110 MMHG | WEIGHT: 121.19 LBS | RESPIRATION RATE: 19 BRPM | HEIGHT: 63 IN | HEART RATE: 77 BPM | OXYGEN SATURATION: 96 % | BODY MASS INDEX: 21.47 KG/M2

## 2023-12-12 PROCEDURE — 99214 OFFICE O/P EST MOD 30 MIN: CPT | Mod: PBBFAC

## 2023-12-12 RX ORDER — FERROUS SULFATE TAB 325 MG (65 MG ELEMENTAL FE) 325 (65 FE) MG
325 TAB ORAL DAILY
COMMUNITY
Start: 2023-11-04

## 2023-12-12 NOTE — PROGRESS NOTES
Postpartum Clinic Follow      HPI      Shakir Llanos is a 22 y.o.  S/P  Post-Partum here at clinic for 6 wk f/u visit.    Pt is s/p  at 40 week 0 day gestation on 10/25.Pregnancy complicated by depression . Patient is doing well today; has support at home and is currently Breastfeeding. Eating and sleeping well at this time. She denies depressed mood or suicidal/homicidal ideations. States infant is Home, doing well.     ROS    - see HPI    Physical Exam      Vitals:    23 0841   BP: 110/72   Pulse: 77   Resp: 19   Temp: 97.7 °F (36.5 °C)     General:  VSS, afebrile. No acute distress.   Respiratory: Non labored.  No coughing.  Cardiovascular:  No peripheral edema.  DP pulses palpable bilaterally.   ABD: BS+, Soft, nontender  Musculoskeletal:  Full range of motion of all extremities; no joint deformities or edema.   Neurologic:  A&O X 3.    Psychiatric:  Calm, cooperative. Mood and effect normal.  Responses appropriate.     [place]  Depression Scale: 0    Medication List with Changes/Refills   Current Medications    DOCUSATE SODIUM (COLACE) 100 MG CAPSULE    Take 2 capsules (200 mg total) by mouth 2 (two) times daily as needed for Constipation.    FEROSUL 325 MG (65 MG IRON) TAB TABLET    Take 325 mg by mouth once daily.    IBUPROFEN (ADVIL,MOTRIN) 600 MG TABLET    Take 1 tablet (600 mg total) by mouth every 6 (six) hours as needed for Pain.    M- PLUS 27 MG IRON- 1 MG TAB    Take 1 tablet (1 each total) by mouth once daily.       Assessment and Plan   Status post  routine postpartum follow-up  Doing well overall  Educated pt on signs and sx of post partum blues, post partum depression and post partum psychosis.   Plans to breast feed  Contraception: Nexplanon (Due to recent unprotected intercourse pt will follow up next week for placement)     Follow up: 1 week for nexplanon placement  PCP: None    Ajay Nolen MD  [facility]

## 2024-01-08 NOTE — PROGRESS NOTES
Date of Service: 12/12/2023     Attending Attestation: Patient discussed with resident. The chart was reviewed thoroughly including pertinent vitals, labs, imaging, medications, prior notes, and consultant/specialist recommendations.  I participated in the management of the patient, reviewed the summary of the plan, and was immediately available at all times throughout the encounter. Services were furnished in a primary care center located in the outpatient department of a [facility] hospital. I agree with the resident's findings and plan as documented in the resident's note.    Valery Rankin MD  Attending - Family Medicine / Geriatric Medicine  [facility] [place], [facility]

## 2024-01-29 PROBLEM — Z37.9 NORMAL LABOR: Status: RESOLVED | Noted: 2023-10-27 | Resolved: 2024-01-29
